# Patient Record
Sex: FEMALE | Race: WHITE | NOT HISPANIC OR LATINO | Employment: UNEMPLOYED | URBAN - NONMETROPOLITAN AREA
[De-identification: names, ages, dates, MRNs, and addresses within clinical notes are randomized per-mention and may not be internally consistent; named-entity substitution may affect disease eponyms.]

---

## 2020-12-05 ENCOUNTER — HOSPITAL ENCOUNTER (EMERGENCY)
Facility: HOSPITAL | Age: 25
Discharge: HOME | End: 2020-12-05
Attending: EMERGENCY MEDICINE
Payer: MEDICARE

## 2020-12-05 VITALS
DIASTOLIC BLOOD PRESSURE: 84 MMHG | OXYGEN SATURATION: 99 % | RESPIRATION RATE: 18 BRPM | HEIGHT: 62 IN | TEMPERATURE: 98 F | HEART RATE: 79 BPM | WEIGHT: 227.81 LBS | BODY MASS INDEX: 41.92 KG/M2 | SYSTOLIC BLOOD PRESSURE: 133 MMHG

## 2020-12-05 DIAGNOSIS — H02.845 SWELLING OF LEFT LOWER EYELID: Primary | ICD-10-CM

## 2020-12-05 PROCEDURE — 99283 EMERGENCY DEPT VISIT LOW MDM: CPT

## 2020-12-05 RX ORDER — SULFAMETHOXAZOLE AND TRIMETHOPRIM 800; 160 MG/1; MG/1
1 TABLET ORAL 2 TIMES DAILY
Qty: 14 TABLET | Refills: 0 | Status: SHIPPED | OUTPATIENT
Start: 2020-12-05 | End: 2020-12-12

## 2020-12-05 RX ORDER — DEXTROAMPHETAMINE SACCHARATE, AMPHETAMINE ASPARTATE MONOHYDRATE, DEXTROAMPHETAMINE SULFATE AND AMPHETAMINE SULFATE 2.5; 2.5; 2.5; 2.5 MG/1; MG/1; MG/1; MG/1
20 CAPSULE, EXTENDED RELEASE ORAL EVERY MORNING
Status: ON HOLD | COMMUNITY
End: 2022-12-19 | Stop reason: HOSPADM

## 2020-12-05 RX ORDER — ARIPIPRAZOLE 10 MG/1
10 TABLET, ORALLY DISINTEGRATING ORAL DAILY
Status: ON HOLD | COMMUNITY
End: 2022-12-19 | Stop reason: HOSPADM

## 2020-12-05 RX ORDER — HYDROXYZINE PAMOATE 25 MG/1
25 CAPSULE ORAL DAILY
Status: ON HOLD | COMMUNITY
End: 2022-12-19 | Stop reason: HOSPADM

## 2020-12-05 NOTE — ED NOTES
NEUROLOGICAL:   Patient is awake , alert  and oriented x 4 . Pupils are PERRL. Gait is steady.   Moves all extremities without difficulty.   Patient reports no neuro complaints..  GCS 15    HEENT:   LEFT EYE REDNESS AND SWELLING. STARTING THIS MORNING    CARDIOVASCULAR:   S1 and S2 present, no murmurs, gallops, or rubs, rate regular  and pulses palpable (2+)    On palpation no edema noted , noted to none.   Patient reports no CV complaints.  .   Patient vitals are WNL.    RESPIRATORY:   Airway Clear, Open, and Patent.  Respirations are even and unlabored.   Breath sounds clear  to all lung fields.   Patient reports no respiratory complaints.     GASTROINTESTINAL:   Abdomen is soft  and non-tender x 4 quadrants. Bowel sounds are normoactive to all quadrants .   Patient reports no GI complaints .     GENITOURINARY:   Patient reports no  complaints.     MUSCULOSKELETAL:   full range of motion to all extremities, no swelling noted , no tenderness noted and no weakness noted.   Patient reports no musculoskeletal complaints     SKIN:   Skin appears warm , dry , good turgor, color normal for race and intact. Patient reports no skin complaints .

## 2020-12-05 NOTE — ED PROVIDER NOTES
Encounter Date: 12/5/2020       History     Chief Complaint   Patient presents with    Eye Problem     woke up with left eye swollen and red about 10 am today.     This is a 25-year-old white female with no significant past medical history who presents emergency department with complaints of left eye redness and pain that began this morning upon waking up.  Patient states she woke up and noticed that her left lower eyelid was painful, swollen, and red.  Patient cannot isolate 1 solitary area of pain but rather states that the entire area swollen and painful.  She denies known injury or known precipitating events, and denies previous similar symptoms in the past.  Patient denies vision changes, fever, chills, or facial injury.        Review of patient's allergies indicates:  No Known Allergies  History reviewed. No pertinent past medical history.  History reviewed. No pertinent surgical history.  History reviewed. No pertinent family history.  Social History     Tobacco Use    Smoking status: Never Smoker    Smokeless tobacco: Never Used   Substance Use Topics    Alcohol use: Never     Frequency: Never    Drug use: Never     Review of Systems   Constitutional: Negative.    Eyes: Negative for discharge and itching.        Lower eyelid pain and redness, however eye not affected   Respiratory: Negative.    Cardiovascular: Negative.    Musculoskeletal: Negative.    Neurological: Negative.        Physical Exam     Initial Vitals [12/05/20 1315]   BP Pulse Resp Temp SpO2   133/84 79 18 98.1 °F (36.7 °C) 99 %      MAP       --         Physical Exam    Nursing note and vitals reviewed.  Constitutional: She appears well-developed and well-nourished. No distress.   HENT:   Head: Normocephalic and atraumatic.   Right Ear: External ear normal.   Left Ear: External ear normal.   Mouth/Throat: Oropharynx is clear and moist.   Eyes: Conjunctivae and EOM are normal. Pupils are equal, round, and reactive to light.   Left lower  eyelid an area near nasolacrimal duct appears mildly erythematous with mild swelling.  The patient does have mild pain upon palpation to the area, however there is no nodule or lump noted.   Neck: Normal range of motion. Neck supple.   Cardiovascular: Normal rate, regular rhythm and normal heart sounds.   Pulmonary/Chest: Breath sounds normal.   Abdominal: Soft. Bowel sounds are normal.   Musculoskeletal: Normal range of motion.   Lymphadenopathy:     She has no cervical adenopathy.   Neurological: She is alert and oriented to person, place, and time. GCS score is 15. GCS eye subscore is 4. GCS verbal subscore is 5. GCS motor subscore is 6.   Skin: Skin is warm and dry. Capillary refill takes less than 2 seconds.   Psychiatric: She has a normal mood and affect. Thought content normal.         ED Course   Procedures  Labs Reviewed - No data to display       Imaging Results    None          Medical Decision Making:   Differential Diagnosis:   Cellulitis  Contact dermatitis  Allergy symptoms  Obstruction of nasolacrimal duct                   ED Course as of Dec 05 1336   Sat Dec 05, 2020   1334 Will treat empirically for cellulitis or underlying bacterial infection    [CB]      ED Course User Index  [CB] Lani Rahman NP            Clinical Impression:     ICD-10-CM ICD-9-CM   1. Swelling of left lower eyelid  H02.845 374.82                          ED Disposition Condition    Discharge Stable        ED Prescriptions     Medication Sig Dispense Start Date End Date Auth. Provider    sulfamethoxazole-trimethoprim 800-160mg (BACTRIM DS) 800-160 mg Tab Take 1 tablet by mouth 2 (two) times daily. for 7 days 14 tablet 12/5/2020 12/12/2020 Lani Rahman NP        Follow-up Information     Follow up With Specialties Details Why Contact Info    PCP Follow UP  Call in 2 days for follow-up, for re-evaluation of today's complaint                                        Lani Rahman NP  12/05/20 1338

## 2020-12-05 NOTE — DISCHARGE INSTRUCTIONS
Take medications as directed and it is also recommended that you take Benadryl or Zyrtec for allergy symptoms.

## 2022-08-10 ENCOUNTER — HOSPITAL ENCOUNTER (EMERGENCY)
Facility: HOSPITAL | Age: 27
Discharge: HOME | End: 2022-08-10
Attending: EMERGENCY MEDICINE
Payer: MEDICARE

## 2022-08-10 VITALS
RESPIRATION RATE: 18 BRPM | OXYGEN SATURATION: 99 % | TEMPERATURE: 98 F | HEIGHT: 62 IN | WEIGHT: 268 LBS | SYSTOLIC BLOOD PRESSURE: 130 MMHG | BODY MASS INDEX: 49.32 KG/M2 | DIASTOLIC BLOOD PRESSURE: 78 MMHG | HEART RATE: 91 BPM

## 2022-08-10 DIAGNOSIS — H60.62 CHRONIC OTITIS EXTERNA OF LEFT EAR, UNSPECIFIED TYPE: Primary | ICD-10-CM

## 2022-08-10 PROCEDURE — 99283 EMERGENCY DEPT VISIT LOW MDM: CPT

## 2022-08-10 RX ORDER — NEOMYCIN SULFATE, POLYMYXIN B SULFATE AND HYDROCORTISONE 10; 3.5; 1 MG/ML; MG/ML; [USP'U]/ML
3 SUSPENSION/ DROPS AURICULAR (OTIC) 3 TIMES DAILY
Qty: 5 ML | Refills: 0 | Status: SHIPPED | OUTPATIENT
Start: 2022-08-10 | End: 2022-08-17

## 2022-08-11 NOTE — DISCHARGE INSTRUCTIONS
Use medication as directed.  Follow-up with ENT for further evaluation of your symptoms.  Return to the emergency room for worsening condition.

## 2022-08-11 NOTE — ED PROVIDER NOTES
Encounter Date: 8/10/2022       History     Chief Complaint   Patient presents with    Otalgia     Recurring ear infections to left ear. Onset 3 days ago.      This is a 27-year-old white female with noncontributory past medical history who presents to the emergency department with complaints of left ear pain.  Patient reports last month she experienced and was treated for to ear infections, stating her PCP prescribed her oral and topical antibiotics for ear infection.  The patient reports that her symptoms resolved, however she began experiencing intermittent left earache 3 days ago.  She denies URI signs and symptoms, fever, ear drainage, or any other relative symptoms at this time.        Review of patient's allergies indicates:   Allergen Reactions    Hydrocodone-acetaminophen Itching and Rash     Past Medical History:   Diagnosis Date    Narcolepsy      No past surgical history on file.  No family history on file.  Social History     Tobacco Use    Smoking status: Never Smoker    Smokeless tobacco: Never Used   Substance Use Topics    Alcohol use: Never    Drug use: Never     Review of Systems   Constitutional: Negative for fever.   HENT: Positive for ear pain. Negative for ear discharge, rhinorrhea and sore throat.    Respiratory: Negative.    Cardiovascular: Negative.        Physical Exam     Initial Vitals [08/10/22 2036]   BP Pulse Resp Temp SpO2   130/78 91 18 98.4 °F (36.9 °C) 99 %      MAP       --         Physical Exam    Nursing note and vitals reviewed.  Constitutional: She appears well-developed and well-nourished. She is active. No distress.   HENT:   Head: Normocephalic and atraumatic.   Right Ear: Tympanic membrane and ear canal normal. No mastoid tenderness.   Left Ear: Tympanic membrane normal. There is swelling. No mastoid tenderness.  No middle ear effusion.   Eyes: EOM are normal. Pupils are equal, round, and reactive to light.   Neck: Neck supple.   Normal range of  motion.  Cardiovascular: Normal rate.   Pulmonary/Chest: No respiratory distress.   Musculoskeletal:      Cervical back: Normal range of motion and neck supple.     Neurological: She is alert and oriented to person, place, and time. GCS eye subscore is 4. GCS verbal subscore is 5. GCS motor subscore is 6.   Skin: Skin is warm and dry. Capillary refill takes less than 2 seconds.   Psychiatric: She has a normal mood and affect. Her behavior is normal. Thought content normal.         ED Course   Procedures  Labs Reviewed - No data to display       Imaging Results    None          Medications - No data to display                       Clinical Impression:   Final diagnoses:  [H60.62] Chronic otitis externa of left ear, unspecified type (Primary)          ED Disposition Condition    Discharge Stable        ED Prescriptions     Medication Sig Dispense Start Date End Date Auth. Provider    neomycin-polymyxin-hydrocortisone (CORTISPORIN) 3.5-10,000-1 mg/mL-unit/mL-% otic suspension Place 3 drops into the left ear 3 (three) times daily. for 7 days 5 mL 8/10/2022 8/17/2022 Lani Rahman NP        Follow-up Information     Follow up With Specialties Details Why Contact Info    [facility]  Schedule an appointment as soon as possible for a visit in 1 week for re-evaluation of today's complaint [address] [facility] [id number]  [phone]             Lani Rahman NP  08/10/22 2050

## 2022-12-13 ENCOUNTER — HOSPITAL ENCOUNTER (INPATIENT)
Facility: HOSPITAL | Age: 27
LOS: 6 days | Discharge: HOME | DRG: 885 | End: 2022-12-19
Attending: EMERGENCY MEDICINE | Admitting: EMERGENCY MEDICINE
Payer: MEDICARE

## 2022-12-13 DIAGNOSIS — T50.902A INTENTIONAL OVERDOSE, INITIAL ENCOUNTER: Primary | ICD-10-CM

## 2022-12-13 DIAGNOSIS — T50.901A OVERDOSE: ICD-10-CM

## 2022-12-13 LAB
ALBUMIN SERPL BCP-MCNC: 3.6 G/DL (ref 3.5–5.2)
ALP SERPL-CCNC: 89 U/L (ref 55–135)
ALT SERPL W/O P-5'-P-CCNC: 41 U/L (ref 10–44)
AMPHET+METHAMPHET UR QL: NEGATIVE
ANION GAP SERPL CALC-SCNC: 8 MMOL/L (ref 8–16)
APAP SERPL-MCNC: <2 UG/ML (ref 10–20)
AST SERPL-CCNC: 24 U/L (ref 10–40)
B-HCG UR QL: NEGATIVE
BACTERIA #/AREA URNS HPF: ABNORMAL /HPF
BARBITURATES UR QL SCN>200 NG/ML: NEGATIVE
BASOPHILS # BLD AUTO: 0.06 K/UL (ref 0–0.2)
BASOPHILS NFR BLD: 0.6 % (ref 0–1.9)
BENZODIAZ UR QL SCN>200 NG/ML: NEGATIVE
BILIRUB SERPL-MCNC: 0.3 MG/DL (ref 0.1–1)
BILIRUB UR QL STRIP: NEGATIVE
BUN SERPL-MCNC: 7 MG/DL (ref 6–20)
BZE UR QL SCN: NEGATIVE
CALCIUM SERPL-MCNC: 8.6 MG/DL (ref 8.7–10.5)
CANNABINOIDS UR QL SCN: NEGATIVE
CHLORIDE SERPL-SCNC: 113 MMOL/L (ref 95–110)
CLARITY UR: CLEAR
CO2 SERPL-SCNC: 21 MMOL/L (ref 23–29)
COLOR UR: YELLOW
CREAT SERPL-MCNC: 1 MG/DL (ref 0.5–1.4)
CREAT UR-MCNC: 40.2 MG/DL (ref 15–325)
CTP QC/QA: YES
DIFFERENTIAL METHOD: ABNORMAL
EOSINOPHIL # BLD AUTO: 0.1 K/UL (ref 0–0.5)
EOSINOPHIL NFR BLD: 0.8 % (ref 0–8)
EPITH CASTS #/AREA URNS LPF: 6 /LPF
ERYTHROCYTE [DISTWIDTH] IN BLOOD BY AUTOMATED COUNT: 14.7 % (ref 11.5–14.5)
EST. GFR  (NO RACE VARIABLE): >60 ML/MIN/1.73 M^2
ESTIMATED AVG GLUCOSE: 108 MG/DL (ref 68–131)
ETHANOL SERPL-MCNC: 137 MG/DL
ETHANOL SERPL-MCNC: 172 MG/DL
GLUCOSE SERPL-MCNC: 122 MG/DL (ref 70–110)
GLUCOSE UR QL STRIP: NEGATIVE
HBA1C MFR BLD: 5.4 % (ref 4–5.6)
HCT VFR BLD AUTO: 41.3 % (ref 37–48.5)
HGB BLD-MCNC: 13.6 G/DL (ref 12–16)
HGB UR QL STRIP: NEGATIVE
IMM GRANULOCYTES # BLD AUTO: 0.03 K/UL (ref 0–0.04)
IMM GRANULOCYTES NFR BLD AUTO: 0.3 % (ref 0–0.5)
KETONES UR QL STRIP: NEGATIVE
LEUKOCYTE ESTERASE UR QL STRIP: ABNORMAL
LYMPHOCYTES # BLD AUTO: 2.6 K/UL (ref 1–4.8)
LYMPHOCYTES NFR BLD: 27.1 % (ref 18–48)
MCH RBC QN AUTO: 26.2 PG (ref 27–31)
MCHC RBC AUTO-ENTMCNC: 32.9 G/DL (ref 32–36)
MCV RBC AUTO: 80 FL (ref 82–98)
METHADONE UR QL SCN>300 NG/ML: NEGATIVE
MICROSCOPIC COMMENT: ABNORMAL
MONOCYTES # BLD AUTO: 0.8 K/UL (ref 0.3–1)
MONOCYTES NFR BLD: 8.4 % (ref 4–15)
NEUTROPHILS # BLD AUTO: 6.1 K/UL (ref 1.8–7.7)
NEUTROPHILS NFR BLD: 62.8 % (ref 38–73)
NITRITE UR QL STRIP: NEGATIVE
NRBC BLD-RTO: 0 /100 WBC
OPIATES UR QL SCN: NEGATIVE
PCP UR QL SCN>25 NG/ML: NEGATIVE
PH UR STRIP: 7 [PH] (ref 5–8)
PLATELET # BLD AUTO: 355 K/UL (ref 150–450)
PMV BLD AUTO: 9.6 FL (ref 9.2–12.9)
POTASSIUM SERPL-SCNC: 3.4 MMOL/L (ref 3.5–5.1)
PROT SERPL-MCNC: 7.9 G/DL (ref 6–8.4)
PROT UR QL STRIP: NEGATIVE
RBC # BLD AUTO: 5.19 M/UL (ref 4–5.4)
RBC #/AREA URNS HPF: 2 /HPF (ref 0–4)
SALICYLATES SERPL-MCNC: <1.7 MG/DL (ref 15–30)
SARS-COV-2 RDRP RESP QL NAA+PROBE: NEGATIVE
SODIUM SERPL-SCNC: 142 MMOL/L (ref 136–145)
SP GR UR STRIP: <=1.005 (ref 1–1.03)
TOXICOLOGY INFORMATION: NORMAL
TSH SERPL DL<=0.005 MIU/L-ACNC: 2.26 UIU/ML (ref 0.4–4)
URN SPEC COLLECT METH UR: ABNORMAL
UROBILINOGEN UR STRIP-ACNC: NEGATIVE EU/DL
WBC # BLD AUTO: 9.7 K/UL (ref 3.9–12.7)
WBC #/AREA URNS HPF: 3 /HPF (ref 0–5)
YEAST URNS QL MICRO: ABNORMAL

## 2022-12-13 PROCEDURE — 81025 URINE PREGNANCY TEST: CPT | Performed by: EMERGENCY MEDICINE

## 2022-12-13 PROCEDURE — 80061 LIPID PANEL: CPT | Performed by: STUDENT IN AN ORGANIZED HEALTH CARE EDUCATION/TRAINING PROGRAM

## 2022-12-13 PROCEDURE — 80307 DRUG TEST PRSMV CHEM ANLYZR: CPT | Performed by: EMERGENCY MEDICINE

## 2022-12-13 PROCEDURE — 85025 COMPLETE CBC W/AUTO DIFF WBC: CPT | Performed by: EMERGENCY MEDICINE

## 2022-12-13 PROCEDURE — 80053 COMPREHEN METABOLIC PANEL: CPT | Performed by: EMERGENCY MEDICINE

## 2022-12-13 PROCEDURE — 84443 ASSAY THYROID STIM HORMONE: CPT | Performed by: EMERGENCY MEDICINE

## 2022-12-13 PROCEDURE — 93010 EKG 12-LEAD: ICD-10-PCS | Mod: ,,, | Performed by: INTERNAL MEDICINE

## 2022-12-13 PROCEDURE — 93010 ELECTROCARDIOGRAM REPORT: CPT | Mod: ,,, | Performed by: INTERNAL MEDICINE

## 2022-12-13 PROCEDURE — 80143 DRUG ASSAY ACETAMINOPHEN: CPT | Performed by: EMERGENCY MEDICINE

## 2022-12-13 PROCEDURE — 99285 EMERGENCY DEPT VISIT HI MDM: CPT | Mod: 25

## 2022-12-13 PROCEDURE — 82077 ASSAY SPEC XCP UR&BREATH IA: CPT | Mod: 91 | Performed by: EMERGENCY MEDICINE

## 2022-12-13 PROCEDURE — 36415 COLL VENOUS BLD VENIPUNCTURE: CPT | Performed by: EMERGENCY MEDICINE

## 2022-12-13 PROCEDURE — 83036 HEMOGLOBIN GLYCOSYLATED A1C: CPT | Performed by: STUDENT IN AN ORGANIZED HEALTH CARE EDUCATION/TRAINING PROGRAM

## 2022-12-13 PROCEDURE — 87635 SARS-COV-2 COVID-19 AMP PRB: CPT | Performed by: EMERGENCY MEDICINE

## 2022-12-13 PROCEDURE — 81000 URINALYSIS NONAUTO W/SCOPE: CPT | Mod: 59 | Performed by: EMERGENCY MEDICINE

## 2022-12-13 PROCEDURE — 11400000 HC PSYCH PRIVATE ROOM

## 2022-12-13 PROCEDURE — 80179 DRUG ASSAY SALICYLATE: CPT | Performed by: EMERGENCY MEDICINE

## 2022-12-13 PROCEDURE — 93005 ELECTROCARDIOGRAM TRACING: CPT

## 2022-12-13 PROCEDURE — 36415 COLL VENOUS BLD VENIPUNCTURE: CPT | Performed by: STUDENT IN AN ORGANIZED HEALTH CARE EDUCATION/TRAINING PROGRAM

## 2022-12-13 RX ORDER — ACETAMINOPHEN 325 MG/1
650 TABLET ORAL EVERY 8 HOURS PRN
Status: DISCONTINUED | OUTPATIENT
Start: 2022-12-13 | End: 2022-12-13

## 2022-12-13 RX ORDER — SODIUM CHLORIDE 0.9 % (FLUSH) 0.9 %
10 SYRINGE (ML) INJECTION
Status: DISCONTINUED | OUTPATIENT
Start: 2022-12-13 | End: 2022-12-13

## 2022-12-13 RX ORDER — TALC
6 POWDER (GRAM) TOPICAL NIGHTLY PRN
Status: DISCONTINUED | OUTPATIENT
Start: 2022-12-13 | End: 2022-12-13

## 2022-12-13 RX ORDER — FAMOTIDINE 20 MG/1
20 TABLET, FILM COATED ORAL 2 TIMES DAILY
Status: DISCONTINUED | OUTPATIENT
Start: 2022-12-13 | End: 2022-12-13

## 2022-12-13 RX ORDER — ONDANSETRON 4 MG/1
8 TABLET, ORALLY DISINTEGRATING ORAL EVERY 8 HOURS PRN
Status: DISCONTINUED | OUTPATIENT
Start: 2022-12-13 | End: 2022-12-13

## 2022-12-13 RX ORDER — IBUPROFEN 200 MG
1 TABLET ORAL DAILY PRN
Status: DISCONTINUED | OUTPATIENT
Start: 2022-12-13 | End: 2022-12-13

## 2022-12-13 NOTE — ED NOTES
Spoke with Teri at poison control, IV fluids and benzos as needed for agitation. Recommended drawing routine labs, Tylenol level, and EKG. Symptomatic treatment plus psych consult. Dr. Nina aware of POC.

## 2022-12-13 NOTE — ED PROVIDER NOTES
Encounter Date: 12/13/2022       History     Chief Complaint   Patient presents with    Drug Overdose     Family stated that pt has been depressed - admitted to taking handful of vistaril and bottle of whiskey.      27-year-old female with a history of depression states roughly 2-1/2 hours ago she took a handful of Vistaril as well as drank some Ramesh Reeves.  History of for doses in the past.  She is alert oriented x3, GCS is 15.  Follows commands.    Review of patient's allergies indicates:   Allergen Reactions    Hydrocodone-acetaminophen Itching and Rash     Past Medical History:   Diagnosis Date    Narcolepsy      No past surgical history on file.  No family history on file.  Social History     Tobacco Use    Smoking status: Never    Smokeless tobacco: Never   Substance Use Topics    Alcohol use: Never    Drug use: Never     Review of Systems   Constitutional:  Negative for fever.   HENT:  Negative for sore throat.    Respiratory:  Negative for shortness of breath.    Cardiovascular:  Negative for chest pain.   Gastrointestinal:  Negative for nausea.   Genitourinary:  Negative for dysuria.   Musculoskeletal:  Negative for back pain.   Skin:  Negative for rash.   Neurological:  Negative for weakness.   Hematological:  Does not bruise/bleed easily.   Psychiatric/Behavioral:  Positive for suicidal ideas.    All other systems reviewed and are negative.    Physical Exam     Initial Vitals   BP Pulse Resp Temp SpO2   12/13/22 1530 12/13/22 1531 12/13/22 1530 12/13/22 1538 12/13/22 1531   (!) 148/101 90 20 99.3 °F (37.4 °C) 100 %      MAP       --                Physical Exam    Nursing note and vitals reviewed.  Constitutional: She appears well-developed and well-nourished. She is not diaphoretic. No distress.   HENT:   Head: Normocephalic and atraumatic.   Mouth/Throat: Oropharynx is clear and moist.   Eyes: Conjunctivae and EOM are normal. Pupils are equal, round, and reactive to light.   Neck: Neck supple. No  tracheal deviation present. No JVD present.   Normal range of motion.  Cardiovascular:  Normal rate, regular rhythm, normal heart sounds and intact distal pulses.           No murmur heard.  Pulmonary/Chest: Breath sounds normal. No stridor. No respiratory distress. She has no wheezes. She has no rhonchi. She has no rales. She exhibits no tenderness.   Abdominal: Abdomen is soft. Bowel sounds are normal.   Musculoskeletal:         General: No tenderness or edema. Normal range of motion.      Cervical back: Normal range of motion and neck supple.     Neurological: She is alert and oriented to person, place, and time. She has normal strength. No cranial nerve deficit. GCS score is 15. GCS eye subscore is 4. GCS verbal subscore is 5. GCS motor subscore is 6.   Skin: Skin is warm and dry. Capillary refill takes less than 2 seconds.   Psychiatric: Her speech is normal. She is withdrawn. She is not actively hallucinating. Cognition and memory are normal. She expresses impulsivity and inappropriate judgment. She exhibits a depressed mood. She expresses suicidal ideation. She expresses suicidal plans.       ED Course   Procedures  Labs Reviewed   CBC W/ AUTO DIFFERENTIAL - Abnormal; Notable for the following components:       Result Value    MCV 80 (*)     MCH 26.2 (*)     RDW 14.7 (*)     All other components within normal limits   COMPREHENSIVE METABOLIC PANEL - Abnormal; Notable for the following components:    Potassium 3.4 (*)     Chloride 113 (*)     CO2 21 (*)     Glucose 122 (*)     Calcium 8.6 (*)     All other components within normal limits   URINALYSIS, REFLEX TO URINE CULTURE - Abnormal; Notable for the following components:    Specific Gravity, UA <=1.005 (*)     Leukocytes, UA Trace (*)     All other components within normal limits    Narrative:     Preferred Collection Type->Urine, Clean Catch  Specimen Source->Urine   ALCOHOL,MEDICAL (ETHANOL) - Abnormal; Notable for the following components:    Alcohol,  Serum 172 (*)     All other components within normal limits   ACETAMINOPHEN LEVEL - Abnormal; Notable for the following components:    Acetaminophen (Tylenol), Serum <2.0 (*)     All other components within normal limits   SALICYLATE LEVEL - Abnormal; Notable for the following components:    Salicylate Lvl <1.7 (*)     All other components within normal limits   URINALYSIS MICROSCOPIC - Abnormal; Notable for the following components:    Epithelial Casts, UA 6 (*)     All other components within normal limits    Narrative:     Preferred Collection Type->Urine, Clean Catch  Specimen Source->Urine   TSH   DRUG SCREEN PANEL, URINE EMERGENCY    Narrative:     Preferred Collection Type->Urine, Clean Catch  Specimen Source->Urine   PREGNANCY TEST, URINE RAPID    Narrative:     Specimen Source->Urine   ALCOHOL,MEDICAL (ETHANOL)   SARS-COV-2 RDRP GENE    Narrative:     ..This test utilizes isothermal nucleic acid amplification technology to detect the SARS-CoV-2 RdRp nucleic acid segment. The analytical sensitivity (limit of detection) is 500 copies/swab.     A POSITIVE result is indicative of the presence of SARS-CoV-2 RNA; clinical correlation with patient history and other diagnostic information is necessary to determine patient infection status.    A NEGATIVE result means that SARS-CoV-2 nucleic acids are not present above the limit of detection. A NEGATIVE result should be treated as presumptive. It does not rule out the possibility of COVID-19 and should not be the sole basis for treatment decisions. If COVID-19 is strongly suspected based on clinical and exposure history, re-testing using an alternate molecular assay should be considered.     This test is only for use under the Food and Drug Administration s Emergency Use Authorization (EUA).     Commercial kits are provided by [employer]. Performance characteristics of the EUA have been independently verified by [facility] Department of Pathology  and Laboratory Medicine.   _________________________________________________________________   The authorized Fact Sheet for Healthcare Providers and the authorized Fact Sheet for Patients of the ID NOW COVID-19 are available on the FDA website:    https://www.fda.gov/media/822618/download      https://www.fda.gov/media/490458/download         EKG Readings: (Independently Interpreted)   Initial Reading: No STEMI. Rhythm: Normal Sinus Rhythm. Heart Rate: 92. Ectopy: No Ectopy. Conduction: Normal. ST Segments: Normal ST Segments. T Waves: Normal. Axis: Normal. Clinical Impression: Normal Sinus Rhythm Other Impression: QRS interval is 88 milliseconds     Imaging Results    None          Medications - No data to display  Medical Decision Making:   Differential Diagnosis:   Major depression, overdose, suicide attempt           ED Course as of 12/13/22 1729   Tue Dec 13, 2022   1652 Discussed case with  - accepted to the acute psychiatric unit once the alcohol dips below 150 [SD]      ED Course User Index  [SD] Nathen Nina MD                 Clinical Impression:   Final diagnoses:  [T50.901A] Overdose  [T50.902A] Intentional overdose, initial encounter (Primary)        ED Disposition Condition    Admit Stable                Nathen Nina MD  12/13/22 8194

## 2022-12-13 NOTE — ED NOTES
Security notified that pt is ready to be transported. Pt has been calm and cooperative, resting comfortably since changed.

## 2022-12-14 PROBLEM — R45.851 SUICIDAL IDEATION: Status: ACTIVE | Noted: 2022-12-14

## 2022-12-14 PROBLEM — F10.10 ALCOHOL ABUSE: Status: ACTIVE | Noted: 2022-12-14

## 2022-12-14 PROBLEM — E66.01 SEVERE OBESITY (BMI >= 40): Status: ACTIVE | Noted: 2022-12-14

## 2022-12-14 LAB
CHOLEST SERPL-MCNC: 203 MG/DL (ref 120–199)
CHOLEST/HDLC SERPL: 5.8 {RATIO} (ref 2–5)
HDLC SERPL-MCNC: 35 MG/DL (ref 40–75)
HDLC SERPL: 17.2 % (ref 20–50)
LDLC SERPL CALC-MCNC: 122.4 MG/DL (ref 63–159)
NONHDLC SERPL-MCNC: 168 MG/DL
TRIGL SERPL-MCNC: 228 MG/DL (ref 30–150)

## 2022-12-14 PROCEDURE — 93010 EKG 12-LEAD: ICD-10-PCS | Mod: ,,, | Performed by: INTERNAL MEDICINE

## 2022-12-14 PROCEDURE — 11400000 HC PSYCH PRIVATE ROOM

## 2022-12-14 PROCEDURE — 90833 PSYTX W PT W E/M 30 MIN: CPT | Mod: ,,, | Performed by: STUDENT IN AN ORGANIZED HEALTH CARE EDUCATION/TRAINING PROGRAM

## 2022-12-14 PROCEDURE — 36415 COLL VENOUS BLD VENIPUNCTURE: CPT | Performed by: STUDENT IN AN ORGANIZED HEALTH CARE EDUCATION/TRAINING PROGRAM

## 2022-12-14 PROCEDURE — 93010 ELECTROCARDIOGRAM REPORT: CPT | Mod: ,,, | Performed by: INTERNAL MEDICINE

## 2022-12-14 PROCEDURE — 99223 1ST HOSP IP/OBS HIGH 75: CPT | Mod: ,,, | Performed by: STUDENT IN AN ORGANIZED HEALTH CARE EDUCATION/TRAINING PROGRAM

## 2022-12-14 PROCEDURE — 93005 ELECTROCARDIOGRAM TRACING: CPT

## 2022-12-14 PROCEDURE — 99223 PR INITIAL HOSPITAL CARE,LEVL III: ICD-10-PCS | Mod: ,,, | Performed by: STUDENT IN AN ORGANIZED HEALTH CARE EDUCATION/TRAINING PROGRAM

## 2022-12-14 PROCEDURE — 90833 PR PSYCHOTHERAPY W/PATIENT W/E&M, 30 MIN (ADD ON): ICD-10-PCS | Mod: ,,, | Performed by: STUDENT IN AN ORGANIZED HEALTH CARE EDUCATION/TRAINING PROGRAM

## 2022-12-14 NOTE — SUBJECTIVE & OBJECTIVE
Past Medical History:   Diagnosis Date    Narcolepsy        No past surgical history on file.    Review of patient's allergies indicates:   Allergen Reactions    Hydrocodone-acetaminophen Itching and Rash       No current facility-administered medications on file prior to encounter.     Current Outpatient Medications on File Prior to Encounter   Medication Sig    ARIPiprazole (ABILIFY) 10 MG disintegrating tablet Take 10 mg by mouth once daily.    dextroamphetamine-amphetamine (ADDERALL XR) 10 MG 24 hr capsule Take 20 mg by mouth every morning.    hydrOXYzine pamoate (VISTARIL) 25 MG Cap Take 25 mg by mouth once daily.     Family History    None       Tobacco Use    Smoking status: Never    Smokeless tobacco: Never   Substance and Sexual Activity    Alcohol use: Never    Drug use: Never    Sexual activity: Yes     Review of Systems   Constitutional:  Negative for fatigue and fever.   HENT:  Negative for congestion, ear pain and sore throat.    Eyes:  Negative for pain and discharge.   Respiratory:  Negative for cough, shortness of breath and wheezing.    Gastrointestinal:  Negative for abdominal pain, constipation, diarrhea, nausea and vomiting.   Endocrine: Negative for cold intolerance and heat intolerance.   Genitourinary:  Negative for difficulty urinating, dysuria and frequency.   Musculoskeletal:  Negative for arthralgias.   Allergic/Immunologic: Negative for environmental allergies.   Neurological:  Negative for dizziness, tremors and seizures.   Psychiatric/Behavioral:  Positive for behavioral problems, dysphoric mood, sleep disturbance and suicidal ideas.    All other systems reviewed and are negative.  Objective:     Vital Signs (Most Recent):  Temp: 98.3 °F (36.8 °C) (12/14/22 0721)  Pulse: 82 (12/14/22 0721)  Resp: 20 (12/14/22 0721)  BP: (!) 134/91 (12/14/22 0721)  SpO2: 99 % (12/14/22 0721)   Vital Signs (24h Range):  Temp:  [98.3 °F (36.8 °C)-99.4 °F (37.4 °C)] 98.3 °F (36.8 °C)  Pulse:  [82-96]  82  Resp:  [16-20] 20  SpO2:  [96 %-100 %] 99 %  BP: (112-148)/() 134/91     Weight: 121.6 kg (268 lb)  Body mass index is 49.02 kg/m².    Physical Exam  Vitals and nursing note reviewed.   Constitutional:       Appearance: Normal appearance.   HENT:      Head: Normocephalic and atraumatic.      Nose: Nose normal.      Mouth/Throat:      Mouth: Mucous membranes are moist.      Pharynx: Oropharynx is clear.   Eyes:      Extraocular Movements: Extraocular movements intact.      Conjunctiva/sclera: Conjunctivae normal.      Pupils: Pupils are equal, round, and reactive to light.   Cardiovascular:      Rate and Rhythm: Normal rate and regular rhythm.      Pulses: Normal pulses.      Heart sounds: Normal heart sounds.   Pulmonary:      Effort: Pulmonary effort is normal.      Breath sounds: Normal breath sounds.   Abdominal:      General: Abdomen is flat. Bowel sounds are normal.      Palpations: Abdomen is soft.   Musculoskeletal:         General: Normal range of motion.      Cervical back: Normal range of motion and neck supple.   Skin:     General: Skin is warm and dry.      Capillary Refill: Capillary refill takes less than 2 seconds.      Comments: No rashes on limited skin exam.   Neurological:      General: No focal deficit present.      Mental Status: She is alert and oriented to person, place, and time.      Cranial Nerves: No cranial nerve deficit.      Comments: I Olfactory:  Sense of smell intact    II Optic:  Pupils equal round react to light.  Vision intact.    III, IV, VI, Ocular motor, Trochlear, Abducens:  Extraocular movements intact    V Trigeminal:  Facial sensation intact facial sensation intact,, muscles of mastication intact muscles of mastication intact, corneal reflex intact, corneal reflex intact    VII Facial:  Muscles of facial expression intact     VIII Vestibular cochlear: Hearing intact vestibular cochlear: Hearing intact    IX Glossopharyngeal:  Gag reflex intact.  Tasting intact.      X Vagus:  Gag reflex intact.    XI Spinal Accessory:  Shoulder shrug intact.  Head rotation intact.    XII Hypoglossal:  Tongue movements intact.     Psychiatric:      Comments: Patient appears depressed.  SI           CRANIAL NERVES     CN III, IV, VI   Pupils are equal, round, and reactive to light.     Significant Labs: All pertinent labs within the past 24 hours have been reviewed.    Significant Imaging: I have reviewed all pertinent imaging results/findings within the past 24 hours.

## 2022-12-14 NOTE — ASSESSMENT & PLAN NOTE
Body mass index is 49.02 kg/m². Morbid obesity complicates all aspects of disease management from diagnostic modalities to treatment. Weight loss encouraged and health benefits explained to patient.

## 2022-12-14 NOTE — H&P
[facility] Hospital Medicine  History & Physical    Patient Name: Myles Holt  MRN: [id number]  Patient Class: IP- Psych  Admission Date: 12/13/2022  Attending Physician: Sreedhar Zendejas III, MD   Primary Care Provider: [facility]         Patient information was obtained from ER records.     Subjective:     Principal Problem:<principal problem not specified>    Chief Complaint:   Chief Complaint   Patient presents with    Drug Overdose     Family stated that pt has been depressed - admitted to taking handful of vistaril and bottle of whiskey.         HPI:   Chief Complaint   Patient presents with    Drug Overdose       Family stated that pt has been depressed - admitted to taking handful of vistaril and bottle of whiskey.       27-year-old female with a history of depression states roughly 2-1/2 hours ago she took a handful of Vistaril as well as drank some Ramesh Reeves.  History of for doses in the past.  She is alert oriented x3, GCS is 15.  Follows commands.         Past Medical History:   Diagnosis Date    Narcolepsy        No past surgical history on file.    Review of patient's allergies indicates:   Allergen Reactions    Hydrocodone-acetaminophen Itching and Rash       No current facility-administered medications on file prior to encounter.     Current Outpatient Medications on File Prior to Encounter   Medication Sig    ARIPiprazole (ABILIFY) 10 MG disintegrating tablet Take 10 mg by mouth once daily.    dextroamphetamine-amphetamine (ADDERALL XR) 10 MG 24 hr capsule Take 20 mg by mouth every morning.    hydrOXYzine pamoate (VISTARIL) 25 MG Cap Take 25 mg by mouth once daily.     Family History    None       Tobacco Use    Smoking status: Never    Smokeless tobacco: Never   Substance and Sexual Activity    Alcohol use: Never    Drug use: Never    Sexual activity: Yes     Review of Systems   Constitutional:  Negative for fatigue and fever.    HENT:  Negative for congestion, ear pain and sore throat.    Eyes:  Negative for pain and discharge.   Respiratory:  Negative for cough, shortness of breath and wheezing.    Gastrointestinal:  Negative for abdominal pain, constipation, diarrhea, nausea and vomiting.   Endocrine: Negative for cold intolerance and heat intolerance.   Genitourinary:  Negative for difficulty urinating, dysuria and frequency.   Musculoskeletal:  Negative for arthralgias.   Allergic/Immunologic: Negative for environmental allergies.   Neurological:  Negative for dizziness, tremors and seizures.   Psychiatric/Behavioral:  Positive for behavioral problems, dysphoric mood, sleep disturbance and suicidal ideas.    All other systems reviewed and are negative.  Objective:     Vital Signs (Most Recent):  Temp: 98.3 °F (36.8 °C) (12/14/22 0721)  Pulse: 82 (12/14/22 0721)  Resp: 20 (12/14/22 0721)  BP: (!) 134/91 (12/14/22 0721)  SpO2: 99 % (12/14/22 0721)   Vital Signs (24h Range):  Temp:  [98.3 °F (36.8 °C)-99.4 °F (37.4 °C)] 98.3 °F (36.8 °C)  Pulse:  [82-96] 82  Resp:  [16-20] 20  SpO2:  [96 %-100 %] 99 %  BP: (112-148)/() 134/91     Weight: 121.6 kg (268 lb)  Body mass index is 49.02 kg/m².    Physical Exam  Vitals and nursing note reviewed.   Constitutional:       Appearance: Normal appearance.   HENT:      Head: Normocephalic and atraumatic.      Nose: Nose normal.      Mouth/Throat:      Mouth: Mucous membranes are moist.      Pharynx: Oropharynx is clear.   Eyes:      Extraocular Movements: Extraocular movements intact.      Conjunctiva/sclera: Conjunctivae normal.      Pupils: Pupils are equal, round, and reactive to light.   Cardiovascular:      Rate and Rhythm: Normal rate and regular rhythm.      Pulses: Normal pulses.      Heart sounds: Normal heart sounds.   Pulmonary:      Effort: Pulmonary effort is normal.      Breath sounds: Normal breath sounds.   Abdominal:      General: Abdomen is flat. Bowel sounds are normal.       Palpations: Abdomen is soft.   Musculoskeletal:         General: Normal range of motion.      Cervical back: Normal range of motion and neck supple.   Skin:     General: Skin is warm and dry.      Capillary Refill: Capillary refill takes less than 2 seconds.      Comments: No rashes on limited skin exam.   Neurological:      General: No focal deficit present.      Mental Status: She is alert and oriented to person, place, and time.      Cranial Nerves: No cranial nerve deficit.      Comments: I Olfactory:  Sense of smell intact    II Optic:  Pupils equal round react to light.  Vision intact.    III, IV, VI, Ocular motor, Trochlear, Abducens:  Extraocular movements intact    V Trigeminal:  Facial sensation intact facial sensation intact,, muscles of mastication intact muscles of mastication intact, corneal reflex intact, corneal reflex intact    VII Facial:  Muscles of facial expression intact     VIII Vestibular cochlear: Hearing intact vestibular cochlear: Hearing intact    IX Glossopharyngeal:  Gag reflex intact.  Tasting intact.     X Vagus:  Gag reflex intact.    XI Spinal Accessory:  Shoulder shrug intact.  Head rotation intact.    XII Hypoglossal:  Tongue movements intact.     Psychiatric:      Comments: Patient appears depressed.  SI           CRANIAL NERVES     CN III, IV, VI   Pupils are equal, round, and reactive to light.     Significant Labs: All pertinent labs within the past 24 hours have been reviewed.    Significant Imaging: I have reviewed all pertinent imaging results/findings within the past 24 hours.    Assessment/Plan:     Alcohol abuse  Cessation advised monitor for s/s of withdrawal.       Severe obesity (BMI >= 40)  Body mass index is 49.02 kg/m². Morbid obesity complicates all aspects of disease management from diagnostic modalities to treatment. Weight loss encouraged and health benefits explained to patient.         Suicidal ideation  To be admitted to our inpatient psychiatric unit for  further evaluation and management.          VTE Risk Mitigation (From admission, onward)    None             Marcus Marquez Jr, MD  Department of Hospital Medicine   [facility]

## 2022-12-14 NOTE — PLAN OF CARE
"Myles Romanoemiliano admitted to [facility] under the care of Sreedhar Zendejas MD with diagnosis of suicidal ideation with depression. [facility] Patient is a 26yo female who presented to [facility] ER.    Patient withdrawn, depressed, calm, cooperative, quiet, anxious, sleeping, drowsy, and showing pressured speech. Endorses Suicidal Ideation. Denies Homicidal Ideation, Auditory Hallucinations, Visual Hallucinations, Tactile Hallucinations, Gustatory Hallucinations, and Delusions. Patient alcohol blood level was 172 upon arrival to the ER and her UDS was negative for any other substance. Patient's ex-boyfriend moved out of state with another girl and the patient took a handful of vistaril and drank a bottle of Ramesh Reeves. Patient had attempted suicide once before a long time ago by taking pills as well. Patient stated "I fucked up". Patient very tearful upon assessment.      Patient assessed and oriented to room unit and routine. Patient denies pain or discomfort at present and remains injury-free.     LUTHER GOODMAN RN    "

## 2022-12-14 NOTE — NURSING
Pt. A/A.  No C/O pain.  Just admitted at shift change.  Denies SI/HI/AVH currently.  Does have so anxiety and depression.  Flat affect.  Tearful.  Follows instructions.  Will continue to observe.
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
## 2022-12-14 NOTE — PLAN OF CARE
POC reviewed this shift and is on going. Patient is withdrawn, depressed, cooperative, and anxious. Endorses Suicidal Ideation. Denies Homicidal Ideation, Auditory Hallucinations, Visual Hallucinations, Tactile Hallucinations, Gustatory Hallucinations, and Delusions. Minimal interaction with peers, isolating to self. Pt continues to be medication compliant and staff will continue to monitor pt closely while on the unit.

## 2022-12-14 NOTE — HPI
Chief Complaint   Patient presents with    Drug Overdose       Family stated that pt has been depressed - admitted to taking handful of vistaril and bottle of whiskey.       27-year-old female with a history of depression states roughly 2-1/2 hours ago she took a handful of Vistaril as well as drank some Ramesh Reeves.  History of for doses in the past.  She is alert oriented x3, GCS is 15.  Follows commands.

## 2022-12-14 NOTE — PLAN OF CARE
Problem: Bariatric Environmental Safety  Goal: Safety Maintained with Care  Outcome: Ongoing, Progressing     Problem: Violence Risk or Actual  Goal: Anger and Impulse Control  Outcome: Ongoing, Progressing     Problem: Adult Behavioral Health Plan of Care  Goal: Plan of Care Review  Outcome: Ongoing, Progressing  Goal: Patient-Specific Goal (Individualization)  Outcome: Ongoing, Progressing  Goal: Adheres to Safety Considerations for Self and Others  Outcome: Ongoing, Progressing  Goal: Absence of New-Onset Illness or Injury  Outcome: Ongoing, Progressing  Goal: Optimized Coping Skills in Response to Life Stressors  Outcome: Ongoing, Progressing  Goal: Develops/Participates in Therapeutic [place] to Support Successful Transition  Outcome: Ongoing, Progressing  Goal: Rounds/Family Conference  Outcome: Ongoing, Progressing     Problem: Activity and Energy Impairment (Excessive Substance Use)  Goal: Optimized Energy Level (Excessive Substance Use)  Outcome: Ongoing, Progressing     Problem: Behavior Regulation Impairment (Excessive Substance Use)  Goal: Improved Behavioral Control (Excessive Substance Use)  Outcome: Ongoing, Progressing     Problem: Suicidal Behavior  Goal: Suicidal Behavior is Absent or Managed  Outcome: Ongoing, Progressing     Problem: Feelings of Worthlessness, Hopelessness or Excessive Guilt (Depressive Signs/Symptoms)  Goal: Enhanced Self-Esteem and Confidence (Depressive Signs/Symptoms)  Outcome: Ongoing, Progressing     Problem: Nutrition Imbalance (Depressive Signs/Symptoms)  Goal: Optimized Nutrition Intake  Outcome: Ongoing, Progressing     Problem: Sleep Disturbance (Depressive Signs/Symptoms)  Goal: Improved Sleep (Depressive Signs/Symptoms)  Outcome: Ongoing, Progressing

## 2022-12-15 PROCEDURE — 11400000 HC PSYCH PRIVATE ROOM

## 2022-12-15 PROCEDURE — 99233 SBSQ HOSP IP/OBS HIGH 50: CPT | Mod: ,,, | Performed by: STUDENT IN AN ORGANIZED HEALTH CARE EDUCATION/TRAINING PROGRAM

## 2022-12-15 PROCEDURE — 25000003 PHARM REV CODE 250: Performed by: STUDENT IN AN ORGANIZED HEALTH CARE EDUCATION/TRAINING PROGRAM

## 2022-12-15 PROCEDURE — 99233 PR SUBSEQUENT HOSPITAL CARE,LEVL III: ICD-10-PCS | Mod: ,,, | Performed by: STUDENT IN AN ORGANIZED HEALTH CARE EDUCATION/TRAINING PROGRAM

## 2022-12-15 RX ORDER — FLUOXETINE HYDROCHLORIDE 20 MG/1
20 CAPSULE ORAL DAILY
Status: DISCONTINUED | OUTPATIENT
Start: 2022-12-15 | End: 2022-12-16

## 2022-12-15 RX ADMIN — FLUOXETINE 20 MG: 20 CAPSULE ORAL at 02:12

## 2022-12-15 NOTE — PLAN OF CARE
POC reviewed this shift and is ongoing.  Pt cooperative with staff and interacting with peers. Denies SI. No ss of distress. Will continue to monitor for changes and safety.

## 2022-12-15 NOTE — PLAN OF CARE
POC reviewed this shift and is on going. Patient is depressed, cooperative, quiet, and paranoid.does not  Endorses Suicidal Ideation, Homicidal Ideation, Auditory Hallucinations, and Visual Hallucinations. Denies the attempted vistaril overdose,which have caused this admission,have been depressed prior,condition was exacerbated due to non medication compliance,stated I was on prozac for a while. Pt continues to be medication compliant and staff will continue to monitor pt closely while on the unit. Care as per treatment plan.

## 2022-12-15 NOTE — PLAN OF CARE
Pt was encouraged to attend group but refused. Pt was offered 1:1 but refused. Continue to encourage pt to participate in process groups to verbalize feelings and develop healthy coping skills.

## 2022-12-15 NOTE — PROGRESS NOTES
"PSYCHIATRY DAILY INPATIENT PROGRESS NOTE  SUBSEQUENT HOSPITAL VISIT    ENCOUNTER DATE: 12/15/2022  SITE: [facility]    DATE OF ADMISSION: 12/13/2022  3:27 PM  LENGTH OF STAY: 2 days      CHIEF COMPLAINT   Myles Holt is a 27 y.o. female, seen during daily goldstein rounds on the inpatient unit.  Myles Holt presented with the chief complaint of  suicide attempt      The patient was seen and examined. The chart was reviewed.     Reviewed notes from Rns and labs from the last 24 hours.    The patient's case was discussed with the treatment team/care providers today including Rns and     Staff reports no behavioral or management issues.     The patient has been compliant with treatment.      Subjective 12/15/2022       Today the patient reports "yesterday was the first day I drank in a while, and everything, the stress of stuff and depression was piling up and I got overwhelmed."      The patient denies any side effects to medications.        Interim/overnight events per report/notes:          Psychiatric ROS (observed, reported, or endorsed/denied):  Depressed mood - less  Interest/pleasure/anhedonia: less  Guilt/hopelessness/worthlessness - less  Changes in Sleep - No  Changes in Appetite - No  Changes in Concentration - No  Changes in Energy - less  PMA/R- less  Suicidal- active/passive ideations - less  Homicidal ideations: active/passive ideations - No    Hallucinations - No  Delusions - No  Disorganized behavior - No  Disorganized speech - No  Negative symptoms - No    Elevated mood - No  Decreased need for sleep - No  Grandiosity - No  Racing thoughts - No  Impulsivity - No  Irritability- No  Increased energy - No  Distractibility - No  Increase in goal-directed activity or PMA- No    Symptoms of ANTONIO - No  Symptoms of Panic Disorder- less  Symptoms of PTSD - less        Overall progress: Patient is showing mild improvement             Medical ROS  Review of Systems   Constitutional:  Negative " "for chills and fever.   HENT:  Negative for hearing loss.    Eyes:  Negative for blurred vision and double vision.   Respiratory:  Negative for shortness of breath.    Cardiovascular:  Negative for chest pain and palpitations.   Gastrointestinal:  Negative for constipation, diarrhea, nausea and vomiting.   Genitourinary:  Negative for dysuria.   Musculoskeletal:  Negative for back pain and neck pain.   Skin:  Negative for rash.   Neurological:  Negative for dizziness and headaches.   Endo/Heme/Allergies:  Negative for environmental allergies.       PAST MEDICAL HISTORY   Past Medical History:   Diagnosis Date    Narcolepsy            PSYCHOTROPIC MEDICATIONS   Scheduled Meds:  Continuous Infusions:  PRN Meds:.        EXAMINATION    VITALS   Vitals:    12/13/22 1824 12/14/22 0721 12/14/22 1943 12/15/22 0724   BP: 124/79 (!) 134/91 133/73 (!) 135/93   BP Location: Left arm Left arm  Left arm   Patient Position: Sitting Sitting  Sitting   Pulse: 82 82 82 68   Resp: 18 20 16 20   Temp: 99.4 °F (37.4 °C) 98.3 °F (36.8 °C) 98.7 °F (37.1 °C) 98.3 °F (36.8 °C)   TempSrc: Oral Oral  Oral   SpO2: 96% 99% 99% 99%   Weight: 121.6 kg (268 lb)      Height: 5' 2" (1.575 m)          Body mass index is 49.02 kg/m².        CONSTITUTIONAL  General Appearance: unremarkable, age appropriate    MUSCULOSKELETAL  Muscle Strength and Tone:no tremor, no tic  Abnormal Involuntary Movements: No  Gait and Station: non-ataxic    PSYCHIATRIC   Level of Consciousness: awake and alert   Orientation: person, place, and situation  Grooming: Casually dressed and Well groomed  Psychomotor Behavior: normal, cooperative  Speech: normal tone, normal rate, normal pitch, normal volume  Language: grossly intact  Mood: "ok"  Affect: Consistent with mood  Thought Process: linear, logical  Associations: intact   Thought Content: less SI, denies HI, and no delusions  Perceptions: denies AH and denies  VH  Memory: Able to recall past events, Remote intact, and " Recent intact  Attention:Attends to interview without distraction  Fund of Knowledge: Aware of current events and Vocabulary appropriate   Estimate if Intelligence:  below average based on work/education history, vocabulary and mental status exam  Insight: has awareness of illness  Judgment: behavior is adequate to circumstances        DIAGNOSTIC TESTING   Laboratory Results  No results found for this or any previous visit (from the past 24 hour(s)).         MEDICAL DECISION MAKING          ASSESSMENT      MDD  Suicide attempt via Overdose of medications  PTSD  Panic attacks  Alcohol abuse  Nicotine dependence  Obesity  Psychosocial stressors           PROBLEM LIST AND MANAGEMENT PLANS         MDD  - resume home medication prozac 20 mg PO qd today  -increase to 40 mg PO qd tomorrow, reviewed EKG  - pt counseled  - follow up with outpatient mental health providers after discharge for medication management and psychotherapy       PTSD  - resume home medication prozac 20 mg PO qd today  -increase to 40 mg PO qd tomorrow, reviewed EKG  - stop abilify   - pt counseled  - follow up with outpatient mental health providers after discharge for medication management and psychotherapy        Suicide attempt via Overdose of medications  - FM consulted  - continue psychiatric hospitalization  - provide psychotherapeutic interventions and medication management  - monitor         Panic attacks  - hold hydroxyzine for now 2/2 overdose  - pt counseled  - follow up with outpatient mental health providers after discharge for medication management and psychotherapy           Alcohol abuse  - SW consulted for assistance with additional resources   Alcohol Brief Intervention     Discussed with patient that there is concern he/she is drinking at unhealthy levels known to increase his/her risk of alcohol-related health problems - yes  Discussed general feedback on health risks associated with drinking and/or given personalized feedback -  yes  Patient was advised to abstain from alcohol use - Yes     - follow up with outpatient mental health providers after discharge for medication management and psychotherapy          Nicotine dependence  - continue nicotine patch 14 mg PO qd PRN     Obesity  - change abilify to geodon as above  - pt counseled        Psychosocial stressors  - pt counseled  - SW consulted for assistance with additional resources               Discussed diagnosis, risks and benefits of proposed treatment vs alternative treatments vs no treatment, potential side effects of these treatments and the inherent unpredictability of treatment. The patient expresses understanding of the above and displays the capacity to agree with this treatment given said understanding. Patient also agrees that, currently, the benefits outweigh the risks and would like to pursue/continue treatment at this time.    Any medications being used off-label were discussed with the patient inclusive of the evidence base for the use of the medications and consent was obtained for the off-label use of the medication.       DISCHARGE PLANNING  Expected Disposition Plan: Home or Self Care      NEED FOR CONTINUED HOSPITALIZATION  Psychiatric illness continues to pose a potential threat to life or bodily function, of self or others, thereby requiring the need for continued inpatient psychiatric hospitalization: Yes, due to: danger to self, as evidenced by: overdose     Protective inpatient pyschiatric hospitalization required while a safe disposition plan is enacted: Yes    Patient stabilized and ready for discharge from inpatient psychiatric unit: No        STAFF:   Sreedhar Zendejas III, MD  Psychiatry
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
## 2022-12-15 NOTE — PLAN OF CARE
POC reviewed this shift and is on going.  Pt cooperative with staff. No interacting with peers and is withdrawn to her room. No meds admin. Pt went to bed after she ate. No ss of distress. Will continue to monitor for changes and safety.

## 2022-12-16 PROCEDURE — 25000003 PHARM REV CODE 250: Performed by: PSYCHIATRY & NEUROLOGY

## 2022-12-16 PROCEDURE — 99232 PR SUBSEQUENT HOSPITAL CARE,LEVL II: ICD-10-PCS | Mod: ,,, | Performed by: PSYCHIATRY & NEUROLOGY

## 2022-12-16 PROCEDURE — 99232 SBSQ HOSP IP/OBS MODERATE 35: CPT | Mod: ,,, | Performed by: PSYCHIATRY & NEUROLOGY

## 2022-12-16 PROCEDURE — 90833 PSYTX W PT W E/M 30 MIN: CPT | Mod: ,,, | Performed by: PSYCHIATRY & NEUROLOGY

## 2022-12-16 PROCEDURE — 90833 PR PSYCHOTHERAPY W/PATIENT W/E&M, 30 MIN (ADD ON): ICD-10-PCS | Mod: ,,, | Performed by: PSYCHIATRY & NEUROLOGY

## 2022-12-16 PROCEDURE — 25000003 PHARM REV CODE 250: Performed by: STUDENT IN AN ORGANIZED HEALTH CARE EDUCATION/TRAINING PROGRAM

## 2022-12-16 PROCEDURE — 11400000 HC PSYCH PRIVATE ROOM

## 2022-12-16 RX ORDER — FLUOXETINE HYDROCHLORIDE 20 MG/1
40 CAPSULE ORAL DAILY
Status: DISCONTINUED | OUTPATIENT
Start: 2022-12-17 | End: 2022-12-19 | Stop reason: HOSPADM

## 2022-12-16 RX ORDER — ACETAMINOPHEN 325 MG/1
650 TABLET ORAL EVERY 6 HOURS PRN
Status: DISCONTINUED | OUTPATIENT
Start: 2022-12-16 | End: 2022-12-19 | Stop reason: HOSPADM

## 2022-12-16 RX ORDER — FLUOXETINE HYDROCHLORIDE 20 MG/1
20 CAPSULE ORAL ONCE
Status: COMPLETED | OUTPATIENT
Start: 2022-12-16 | End: 2022-12-16

## 2022-12-16 RX ADMIN — FLUOXETINE 20 MG: 20 CAPSULE ORAL at 11:12

## 2022-12-16 RX ADMIN — ACETAMINOPHEN 650 MG: 325 TABLET ORAL at 11:12

## 2022-12-16 RX ADMIN — FLUOXETINE 20 MG: 20 CAPSULE ORAL at 08:12

## 2022-12-16 NOTE — PROGRESS NOTES
"PSYCHIATRY DAILY INPATIENT PROGRESS NOTE  SUBSEQUENT HOSPITAL VISIT    ENCOUNTER DATE: 12/16/2022  SITE: [facility]    DATE OF ADMISSION: 12/13/2022  3:27 PM  LENGTH OF STAY: 3 days    CHIEF COMPLAINT   Myles Holt is a 27 y.o. female, seen during daily goldstein rounds on the inpatient unit.  Myles Holt presented with the chief complaint of  suicide attempt        The patient was seen and examined. The chart was reviewed.      Reviewed notes from Rns and labs from the last 24 hours.     The patient's case was discussed with the treatment team/care providers today including Rns and      Staff reports no behavioral or management issues.      The patient has been compliant with treatment.    Subjective 12/16/2022     Patient reports mood is "good." Currently denies suicidal or homicidal ideations. Patient does admit to overdose attempt, however states that she "decided it wasn't a good idea" and called EMS. Reports that "things have just been really stressful and piling up lately."    Pt reports that she does have an outpatient psychiatrist at [facility]. Also reports history of seeing a private-practice therapist in the [facility] that she plans to re-establish care with post-discharge.     Reports sleeping well last night. Denies SE to current medication regimen.     Psychiatric ROS (observed, reported, or endorsed/denied):  Depressed mood - less  Interest/pleasure/anhedonia: less  Guilt/hopelessness/worthlessness - less  Changes in Sleep - No  Changes in Appetite - No  Changes in Concentration - No  Changes in Energy - less  PMA/R- less  Suicidal- active/passive ideations - less  Homicidal ideations: active/passive ideations - No     Hallucinations - No  Delusions - No  Disorganized behavior - No  Disorganized speech - No  Negative symptoms - No     Elevated mood - No  Decreased need for sleep - No  Grandiosity - No  Racing thoughts - No  Impulsivity - No  Irritability- " No  Increased energy - No  Distractibility - No  Increase in goal-directed activity or PMA- No     Symptoms of ANTONIO - No  Symptoms of Panic Disorder- less  Symptoms of PTSD - less           Overall progress: Patient is showing mild improvement     Psychotherapy:  Target symptoms: depression  Why chosen therapy is appropriate versus another modality: relevant to diagnosis, evidence based practice  Outcome monitoring methods: self-report, observation  Therapeutic intervention type: insight oriented psychotherapy, supportive psychotherapy  Topics discussed/themes: building skills sets for symptom management, symptom recognition  The patient's response to the intervention is accepting. The patient's progress toward treatment goals is good.   Duration of intervention: 16 minutes.    Medical ROS  Review of Systems   Constitutional:  Negative for chills and fever.   HENT:  Negative for hearing loss.    Eyes:  Negative for blurred vision and double vision.   Respiratory:  Negative for shortness of breath.    Cardiovascular:  Negative for chest pain and palpitations.   Gastrointestinal:  Negative for constipation, diarrhea, nausea and vomiting.   Genitourinary:  Negative for dysuria.   Musculoskeletal:  Negative for back pain and neck pain.   Skin:  Negative for rash.   Neurological:  Negative for dizziness and headaches.   Endo/Heme/Allergies:  Negative for environmental allergies.   Psychiatric: As noted    PAST MEDICAL HISTORY   Past Medical History:   Diagnosis Date    Narcolepsy        PSYCHOTROPIC MEDICATIONS   Scheduled Meds:   FLUoxetine  20 mg Oral Daily     Continuous Infusions:  PRN Meds:.    EXAMINATION    VITALS   Vitals:    12/14/22 1943 12/15/22 0724 12/15/22 1921 12/16/22 0748   BP: 133/73 (!) 135/93 116/68 129/87   BP Location:  Left arm Left arm Left arm   Patient Position:  Sitting Sitting Sitting   Pulse: 82 68 76 69   Resp: 16 20 16 18   Temp: 98.7 °F (37.1 °C) 98.3 °F (36.8 °C) 98.8 °F (37.1 °C) 98.9 °F  "(37.2 °C)   TempSrc:  Oral Oral Oral   SpO2: 99% 99% 97% 99%   Weight:       Height:           Body mass index is 49.02 kg/m².       CONSTITUTIONAL  General Appearance: unremarkable, age appropriate     MUSCULOSKELETAL  Muscle Strength and Tone:no tremor, no tic  Abnormal Involuntary Movements: No  Gait and Station: non-ataxic     PSYCHIATRIC   Level of Consciousness: awake and alert   Orientation: person, place, and situation  Grooming: Casually dressed and Well groomed  Psychomotor Behavior: normal, cooperative  Speech: normal tone, normal rate, normal pitch, normal volume  Language: grossly intact  Mood: "Good"  Affect: Consistent with mood  Thought Process: linear, logical  Associations: intact   Thought Content: less SI, denies HI, and no delusions  Perceptions: denies AH and denies  VH  Memory: Able to recall past events, Remote intact, and Recent intact  Attention:Attends to interview without distraction  Fund of Knowledge: Aware of current events and Vocabulary appropriate   Estimate if Intelligence:  below average based on work/education history, vocabulary and mental status exam  Insight: has awareness of illness  Judgment: behavior is adequate to circumstances    DIAGNOSTIC TESTING   Laboratory Results  No results found for this or any previous visit (from the past 24 hour(s)).    ASSESSMENT      MDD  Suicide attempt via Overdose of medications  PTSD  Panic attacks  Alcohol abuse  Nicotine dependence  Obesity  Psychosocial stressors           PROBLEM LIST AND MANAGEMENT PLANS           MDD  - resume home medication prozac 20 mg PO qd today  -increase to 40 mg PO qd tomorrow,   -Increase to 40 mg today-reviewed EKG  - pt counseled  - follow up with outpatient mental health providers after discharge for medication management and psychotherapy        PTSD  - resume home medication prozac 20 mg PO qd today  -increase to 40 mg PO qd tomorrow, reviewed EKG  - stop abilify   - pt counseled  - follow up with " outpatient mental health providers after discharge for medication management and psychotherapy        Suicide attempt via Overdose of medications  - FM consulted  - continue psychiatric hospitalization  - provide psychotherapeutic interventions and medication management  - monitor         Panic attacks  - hold hydroxyzine for now 2/2 overdose  - pt counseled  - follow up with outpatient mental health providers after discharge for medication management and psychotherapy           Alcohol abuse  - SW consulted for assistance with additional resources   Alcohol Brief Intervention     Discussed with patient that there is concern he/she is drinking at unhealthy levels known to increase his/her risk of alcohol-related health problems - yes  Discussed general feedback on health risks associated with drinking and/or given personalized feedback - yes  Patient was advised to abstain from alcohol use - Yes     - follow up with outpatient mental health providers after discharge for medication management and psychotherapy           Nicotine dependence  - continue nicotine patch 14 mg PO qd PRN     Obesity  - change abilify to geodon as above  - pt counseled        Psychosocial stressors  - pt counseled  - SW consulted for assistance with additional resources                  Discussed diagnosis, risks and benefits of proposed treatment vs alternative treatments vs no treatment, potential side effects of these treatments and the inherent unpredictability of treatment. The patient expresses understanding of the above and displays the capacity to agree with this treatment given said understanding. Patient also agrees that, currently, the benefits outweigh the risks and would like to pursue/continue treatment at this time.     Any medications being used off-label were discussed with the patient inclusive of the evidence base for the use of the medications and consent was obtained for the off-label use of the medication.          DISCHARGE PLANNING  Expected Disposition Plan: Home or Self Care        NEED FOR CONTINUED HOSPITALIZATION  Psychiatric illness continues to pose a potential threat to life or bodily function, of self or others, thereby requiring the need for continued inpatient psychiatric hospitalization: Yes, due to: danger to self, as evidenced by: overdose      Protective inpatient pyschiatric hospitalization required while a safe disposition plan is enacted: Yes     Patient stabilized and ready for discharge from inpatient psychiatric unit: No      STAFF:   Isauro Arce NP  Psychiatry

## 2022-12-16 NOTE — PLAN OF CARE
Collateral:   Kirit Radford, father, [phone]    Collateral Perception of Problem: no not really, I just know she got very upset     Previous Psych History/Hospitalizations:  None     Suicide Attempts (how/severity):  This is the first one ermington heard of     How long has pt had problems (childhood dx?):  n/a     Impulse issues:  Nothing out of the ordinary     History of violence:   Not sure     Drug Use:  None     Alcohol Use:  None     Legal Issues:  None     Other Pertinent Info:   Just got reunited, been a little over a year   Was real hurt about breakup with emely she was dating     Baseline:  Very good personality, kind soft hearted jokes a lot sometimes gets quiet isolates self at times     Discharge Plan:  Return home

## 2022-12-16 NOTE — PLAN OF CARE
POC reviewed and ongoing. Pt states that she is doing better. Remains with some depression, but no SI since on unit. Denies any hallucinations. Rested well. Will continue to monitor for safety and any changes.

## 2022-12-16 NOTE — PLAN OF CARE
"Patient is sitting in the dining room eating supper meal at this time. Patient endorses mood is good, depression improving, sleeping well, denies S/I, denies A/VH. No delusions present. Patient attended group session with  today and was actively participating expressing feelings. Patient noted smiling and having several telephone conversations with family and tolerating well. Isauro Arce NP visited this am with new orders noted increase Fluoxetine 40 mg po daily, Give additional Fluoxetine 20 mg x 1. Appetite is good and is medication compliant. PRN Acetaminophen 650 mg po given for c/o headache 10/10 and was noted effective. Patient stated " I am allergic to the Norco not the Tylenol." Close observations continued and safe environment maintained.  "

## 2022-12-17 PROCEDURE — 99232 PR SUBSEQUENT HOSPITAL CARE,LEVL II: ICD-10-PCS | Mod: ,,, | Performed by: STUDENT IN AN ORGANIZED HEALTH CARE EDUCATION/TRAINING PROGRAM

## 2022-12-17 PROCEDURE — 25000003 PHARM REV CODE 250: Performed by: PSYCHIATRY & NEUROLOGY

## 2022-12-17 PROCEDURE — 99232 SBSQ HOSP IP/OBS MODERATE 35: CPT | Mod: ,,, | Performed by: STUDENT IN AN ORGANIZED HEALTH CARE EDUCATION/TRAINING PROGRAM

## 2022-12-17 PROCEDURE — 11400000 HC PSYCH PRIVATE ROOM

## 2022-12-17 RX ADMIN — FLUOXETINE 40 MG: 20 CAPSULE ORAL at 08:12

## 2022-12-17 NOTE — PROGRESS NOTES
"PSYCHIATRY DAILY INPATIENT PROGRESS NOTE  SUBSEQUENT HOSPITAL VISIT    ENCOUNTER DATE: 12/17/2022  SITE: [facility]    DATE OF ADMISSION: 12/13/2022  3:27 PM  LENGTH OF STAY: 4 days    CHIEF COMPLAINT   Myles Holt is a 27 y.o. female, seen during daily goldstein rounds on the inpatient unit.  Myles Holt presented with the chief complaint of  suicide attempt        The patient was seen and examined. The chart was reviewed.      Reviewed notes from NP, RN, SW and labs from the last 24 hours.     The patient's case was discussed with the treatment team/care providers today including Rns     Staff reports no behavioral or management issues.      The patient has been compliant with treatment.      Subjective 12/17/2022    She states she is feeling better. She states she feels medications are effective and denies any ASE.    Reports "I shouldn't have done it," in regards to overdose. She states her coping skill is to talk to her family.    Protective factors are her family.         Psychiatric ROS (observed, reported, or endorsed/denied):  Depressed mood - less  Interest/pleasure/anhedonia: less  Guilt/hopelessness/worthlessness - less  Changes in Sleep - No  Changes in Appetite - No  Changes in Concentration - No  Changes in Energy - less  PMA/R- less  Suicidal- active/passive ideations - less  Homicidal ideations: active/passive ideations - No     Hallucinations - No  Delusions - No  Disorganized behavior - No  Disorganized speech - No  Negative symptoms - No     Elevated mood - No  Decreased need for sleep - No  Grandiosity - No  Racing thoughts - No  Impulsivity - No  Irritability- No  Increased energy - No  Distractibility - No  Increase in goal-directed activity or PMA- No     Symptoms of ANTONIO - No  Symptoms of Panic Disorder- less  Symptoms of PTSD - less           Overall progress: Patient is showing mild improvement     Medical ROS  Review of Systems   Constitutional:  Negative for chills and " "fever.   HENT:  Negative for hearing loss.    Eyes:  Negative for blurred vision and double vision.   Respiratory:  Negative for shortness of breath.    Cardiovascular:  Negative for chest pain and palpitations.   Gastrointestinal:  Negative for constipation, diarrhea, nausea and vomiting.   Genitourinary:  Negative for dysuria.   Musculoskeletal:  Negative for back pain and neck pain.   Skin:  Negative for rash.   Neurological:  Negative for dizziness and headaches.   Endo/Heme/Allergies:  Negative for environmental allergies.   Psychiatric: As noted    PAST MEDICAL HISTORY   Past Medical History:   Diagnosis Date    Narcolepsy        PSYCHOTROPIC MEDICATIONS   Scheduled Meds:   FLUoxetine  40 mg Oral Daily     Continuous Infusions:  PRN Meds:.    EXAMINATION    VITALS   Vitals:    12/15/22 0724 12/15/22 1921 12/16/22 0748 12/17/22 0759   BP: (!) 135/93 116/68 129/87 126/61   BP Location: Left arm Left arm Left arm Right arm   Patient Position: Sitting Sitting Sitting Sitting   Pulse: 68 76 69 78   Resp: 20 16 18 20   Temp: 98.3 °F (36.8 °C) 98.8 °F (37.1 °C) 98.9 °F (37.2 °C) 98 °F (36.7 °C)   TempSrc: Oral Oral Oral Oral   SpO2: 99% 97% 99% 99%   Weight:       Height:           Body mass index is 49.02 kg/m².         CONSTITUTIONAL  General Appearance: unremarkable, age appropriate     MUSCULOSKELETAL  Muscle Strength and Tone:no tremor, no tic  Abnormal Involuntary Movements: No  Gait and Station: non-ataxic     PSYCHIATRIC   Level of Consciousness: awake and alert   Orientation: person, place, and situation  Grooming: Casually dressed and Well groomed  Psychomotor Behavior: normal, cooperative  Speech: normal tone, normal rate, normal pitch, normal volume  Language: grossly intact  Mood: "Good"  Affect: Consistent with mood  Thought Process: linear, logical  Associations: intact   Thought Content: less SI, denies HI, and no delusions  Perceptions: denies AH and denies  VH  Memory: Able to recall past events, " Remote intact, and Recent intact  Attention:Attends to interview without distraction  Fund of Knowledge: Aware of current events and Vocabulary appropriate   Estimate if Intelligence:  below average based on work/education history, vocabulary and mental status exam  Insight: has awareness of illness  Judgment: behavior is adequate to circumstances        DIAGNOSTIC TESTING   Laboratory Results  No results found for this or any previous visit (from the past 24 hour(s)).    ASSESSMENT      MDD  Suicide attempt via Overdose of medications  PTSD  Panic attacks  Alcohol abuse  Nicotine dependence  Obesity  Psychosocial stressors           PROBLEM LIST AND MANAGEMENT PLANS           MDD  - resume home medication prozac 20 mg PO qd today  -increase to 40 mg PO qd tomorrow,   -Increase to 40 mg today-reviewed EKG  - pt counseled  - follow up with outpatient mental health providers after discharge for medication management and psychotherapy        PTSD  - resume home medication prozac 20 mg PO qd today  -increase to 40 mg PO qd tomorrow, reviewed EKG  - stop abilify   - pt counseled  - follow up with outpatient mental health providers after discharge for medication management and psychotherapy        Suicide attempt via Overdose of medications  - FM consulted  - continue psychiatric hospitalization  - provide psychotherapeutic interventions and medication management  - monitor         Panic attacks  - hold hydroxyzine for now 2/2 overdose  - pt counseled  - follow up with outpatient mental health providers after discharge for medication management and psychotherapy           Alcohol abuse  - SW consulted for assistance with additional resources   Alcohol Brief Intervention     Discussed with patient that there is concern he/she is drinking at unhealthy levels known to increase his/her risk of alcohol-related health problems - yes  Discussed general feedback on health risks associated with drinking and/or given personalized  feedback - yes  Patient was advised to abstain from alcohol use - Yes     - follow up with outpatient mental health providers after discharge for medication management and psychotherapy           Nicotine dependence  - continue nicotine patch 14 mg PO qd PRN     Obesity  - change abilify to geodon as above  - pt counseled        Psychosocial stressors  - pt counseled  - SW consulted for assistance with additional resources                  Discussed diagnosis, risks and benefits of proposed treatment vs alternative treatments vs no treatment, potential side effects of these treatments and the inherent unpredictability of treatment. The patient expresses understanding of the above and displays the capacity to agree with this treatment given said understanding. Patient also agrees that, currently, the benefits outweigh the risks and would like to pursue/continue treatment at this time.     Any medications being used off-label were discussed with the patient inclusive of the evidence base for the use of the medications and consent was obtained for the off-label use of the medication.         DISCHARGE PLANNING  Expected Disposition Plan: Home or Self Care        NEED FOR CONTINUED HOSPITALIZATION  Psychiatric illness continues to pose a potential threat to life or bodily function, of self or others, thereby requiring the need for continued inpatient psychiatric hospitalization: Yes, due to: danger to self, as evidenced by: overdose      Protective inpatient pyschiatric hospitalization required while a safe disposition plan is enacted: Yes     Patient stabilized and ready for discharge from inpatient psychiatric unit: No    The patient location is: [facility]    Visit type: audiovisual    Face to Face time with patient: 5  10 minutes of total time spent on the encounter, which includes face to face time and non-face to face time preparing to see the patient (eg, review of tests), Obtaining and/or reviewing separately  obtained history, Documenting clinical information in the electronic or other health record, Independently interpreting results (not separately reported) and communicating results to the patient/family/caregiver, or Care coordination (not separately reported).     Each patient to whom he or she provides medical services by telemedicine is:  (1) informed of the relationship between the physician and patient and the respective role of any other health care provider with respect to management of the patient; and (2) notified that he or she may decline to receive medical services by telemedicine and may withdraw from such care at any time.        STAFF:   Sreedhar Zendejas III, MD  Psychiatry

## 2022-12-17 NOTE — PLAN OF CARE
Patient is lying in bed napping at this time. Patient is alert and oriented, calm, cooperative, endorses feeling better and depression is improving. Patient is tolerating medication recent increase dosage without side effects noted. Denies S/I, denies A/VH. No delusions present. No negative behaviors noted. Patient voiced has had time to think about what brought her to the hospital. Patient is having telephone conversations with family and tolerating well, and focused on discharge. Close observations continued and safe environment maintained.

## 2022-12-17 NOTE — PLAN OF CARE
Problem: Bariatric Environmental Safety  Goal: Safety Maintained with Care  Outcome: Ongoing, Progressing     Problem: Violence Risk or Actual  Goal: Anger and Impulse Control  Outcome: Ongoing, Progressing     Problem: Adult Behavioral Health Plan of Care  Goal: Plan of Care Review  Outcome: Ongoing, Progressing  Goal: Patient-Specific Goal (Individualization)  Outcome: Ongoing, Progressing  Goal: Adheres to Safety Considerations for Self and Others  Outcome: Ongoing, Progressing  Goal: Absence of New-Onset Illness or Injury  Outcome: Ongoing, Progressing  Goal: Optimized Coping Skills in Response to Life Stressors  Outcome: Ongoing, Progressing  Goal: Develops/Participates in Therapeutic [place] to Support Successful Transition  Outcome: Ongoing, Progressing  Goal: Rounds/Family Conference  Outcome: Ongoing, Progressing     Problem: Activity and Energy Impairment (Excessive Substance Use)  Goal: Optimized Energy Level (Excessive Substance Use)  Outcome: Ongoing, Progressing     Problem: Behavior Regulation Impairment (Excessive Substance Use)  Goal: Improved Behavioral Control (Excessive Substance Use)  Outcome: Ongoing, Progressing     Problem: Suicidal Behavior  Goal: Suicidal Behavior is Absent or Managed  Outcome: Ongoing, Progressing     Problem: Feelings of Worthlessness, Hopelessness or Excessive Guilt (Depressive Signs/Symptoms)  Goal: Enhanced Self-Esteem and Confidence (Depressive Signs/Symptoms)  Outcome: Ongoing, Progressing     Problem: Nutrition Imbalance (Depressive Signs/Symptoms)  Goal: Optimized Nutrition Intake  Outcome: Ongoing, Progressing     Problem: Sleep Disturbance (Depressive Signs/Symptoms)  Goal: Improved Sleep (Depressive Signs/Symptoms)  Outcome: Ongoing, Progressing

## 2022-12-17 NOTE — NURSING
Pt. Lying in her bed.  Easily aroused.  No C/O pain.  Denies SI/HI/AVH currently.  Does have some anxiety/depression.  Flat affect.  Compliant and follows instructions.  Will continue to observe.

## 2022-12-18 PROCEDURE — 99232 SBSQ HOSP IP/OBS MODERATE 35: CPT | Mod: ,,, | Performed by: STUDENT IN AN ORGANIZED HEALTH CARE EDUCATION/TRAINING PROGRAM

## 2022-12-18 PROCEDURE — 25000003 PHARM REV CODE 250: Performed by: PSYCHIATRY & NEUROLOGY

## 2022-12-18 PROCEDURE — 11400000 HC PSYCH PRIVATE ROOM

## 2022-12-18 PROCEDURE — 99232 PR SUBSEQUENT HOSPITAL CARE,LEVL II: ICD-10-PCS | Mod: ,,, | Performed by: STUDENT IN AN ORGANIZED HEALTH CARE EDUCATION/TRAINING PROGRAM

## 2022-12-18 RX ADMIN — FLUOXETINE 40 MG: 20 CAPSULE ORAL at 08:12

## 2022-12-18 NOTE — PLAN OF CARE
Problem: Bariatric Environmental Safety  Goal: Safety Maintained with Care  Outcome: Ongoing, Progressing     Problem: Violence Risk or Actual  Goal: Anger and Impulse Control  Outcome: Ongoing, Progressing     Problem: Adult Behavioral Health Plan of Care  Goal: Plan of Care Review  Outcome: Ongoing, Progressing  Goal: Patient-Specific Goal (Individualization)  Outcome: Ongoing, Progressing  Goal: Adheres to Safety Considerations for Self and Others  Outcome: Ongoing, Progressing  Goal: Absence of New-Onset Illness or Injury  Outcome: Ongoing, Progressing  Goal: Optimized Coping Skills in Response to Life Stressors  Outcome: Ongoing, Progressing  Goal: Develops/Participates in Therapeutic [place] to Support Successful Transition  Outcome: Ongoing, Progressing  Goal: Rounds/Family Conference  Outcome: Ongoing, Progressing     Problem: Activity and Energy Impairment (Excessive Substance Use)  Goal: Optimized Energy Level (Excessive Substance Use)  Outcome: Ongoing, Progressing     Problem: Behavior Regulation Impairment (Excessive Substance Use)  Goal: Improved Behavioral Control (Excessive Substance Use)  Outcome: Ongoing, Progressing     Problem: Suicidal Behavior  Goal: Suicidal Behavior is Absent or Managed  Outcome: Ongoing, Progressing     Problem: Feelings of Worthlessness, Hopelessness or Excessive Guilt (Depressive Signs/Symptoms)  Goal: Enhanced Self-Esteem and Confidence (Depressive Signs/Symptoms)  Outcome: Ongoing, Progressing     Problem: Nutrition Imbalance (Depressive Signs/Symptoms)  Goal: Optimized Nutrition Intake  Outcome: Ongoing, Progressing     Problem: Sleep Disturbance (Depressive Signs/Symptoms)  Goal: Improved Sleep (Depressive Signs/Symptoms)  Outcome: Ongoing, Progressing

## 2022-12-18 NOTE — PLAN OF CARE
Patient is sitting in the dining room eating supper at this time. Patient is alert and oriented, has been out of her room most of the day, engaging with staff and peers ad lyn[place], cooperative, and is focused on discharge. Patient watched tv and enjoyed the [place] football game. Patient endorses that her depression improved, denies S/I, and is looking forward to spending [place] with her sister. Dr. Zendejas visited per telemed with no new orders noted. Patient is candidate for discharge tomorrow. Close observations continued and safe environment maintained.

## 2022-12-18 NOTE — PROGRESS NOTES
"PSYCHIATRY DAILY INPATIENT PROGRESS NOTE  SUBSEQUENT HOSPITAL VISIT    ENCOUNTER DATE: 12/18/2022  SITE: [facility]    DATE OF ADMISSION: 12/13/2022  3:27 PM  LENGTH OF STAY: 5 days    CHIEF COMPLAINT   Myles Holt is a 27 y.o. female, seen during daily goldstein rounds on the inpatient unit.  Myles Holt presented with the chief complaint of  suicide attempt        The patient was seen and examined. The chart was reviewed.      Reviewed notes from RN, LPN and labs from the last 24 hours.     The patient's case was discussed with the treatment team/care providers today including Rns     Staff reports no behavioral or management issues.      The patient has been compliant with treatment.      Subjective 12/18/2022    She states "I feel better, my mood is better." Planning for discharge tomorrow. She states "I'm not drinking like that again."    Pt denies ASE to medications.      Psychiatric ROS (observed, reported, or endorsed/denied):  Depressed mood - less  Interest/pleasure/anhedonia: less  Guilt/hopelessness/worthlessness - less  Changes in Sleep - No  Changes in Appetite - No  Changes in Concentration - No  Changes in Energy - less  PMA/R- less  Suicidal- active/passive ideations - less  Homicidal ideations: active/passive ideations - No     Hallucinations - No  Delusions - No  Disorganized behavior - No  Disorganized speech - No  Negative symptoms - No     Elevated mood - No  Decreased need for sleep - No  Grandiosity - No  Racing thoughts - No  Impulsivity - No  Irritability- No  Increased energy - No  Distractibility - No  Increase in goal-directed activity or PMA- No     Symptoms of ANTONIO - No  Symptoms of Panic Disorder- less  Symptoms of PTSD - less           Overall progress: Patient is showing mild improvement         Medical ROS  Review of Systems   Constitutional:  Negative for chills and fever.   HENT:  Negative for hearing loss.    Eyes:  Negative for blurred vision and double vision. " "  Respiratory:  Negative for shortness of breath.    Cardiovascular:  Negative for chest pain and palpitations.   Gastrointestinal:  Negative for constipation, diarrhea, nausea and vomiting.   Genitourinary:  Negative for dysuria.   Musculoskeletal:  Negative for back pain and neck pain.   Skin:  Negative for rash.   Neurological:  Negative for dizziness and headaches.   Endo/Heme/Allergies:  Negative for environmental allergies.   Psychiatric: As noted    PAST MEDICAL HISTORY   Past Medical History:   Diagnosis Date    Narcolepsy        PSYCHOTROPIC MEDICATIONS   Scheduled Meds:   FLUoxetine  40 mg Oral Daily     Continuous Infusions:  PRN Meds:.    EXAMINATION    VITALS   Vitals:    12/16/22 0748 12/17/22 0759 12/17/22 1933 12/18/22 0800   BP: 129/87 126/61 123/75 103/63   BP Location: Left arm Right arm Left arm Left arm   Patient Position: Sitting Sitting Sitting Sitting   Pulse: 69 78 65 63   Resp: 18 20 16 20   Temp: 98.9 °F (37.2 °C) 98 °F (36.7 °C) 98.4 °F (36.9 °C) 98.1 °F (36.7 °C)   TempSrc: Oral Oral Oral Oral   SpO2: 99% 99% 98% 97%   Weight:       Height:           Body mass index is 49.02 kg/m².         CONSTITUTIONAL  General Appearance: unremarkable, age appropriate     MUSCULOSKELETAL  Muscle Strength and Tone:no tremor, no tic  Abnormal Involuntary Movements: No  Gait and Station: non-ataxic     PSYCHIATRIC   Level of Consciousness: awake and alert   Orientation: person, place, and situation  Grooming: Casually dressed and Well groomed  Psychomotor Behavior: normal, cooperative  Speech: normal tone, normal rate, normal pitch, normal volume  Language: grossly intact  Mood: "better"  Affect: Consistent with mood  Thought Process: linear, logical  Associations: intact   Thought Content: less SI, denies HI, and no delusions  Perceptions: denies AH and denies  VH  Memory: Able to recall past events, Remote intact, and Recent intact  Attention:Attends to interview without distraction  Fund of Knowledge: " Aware of current events and Vocabulary appropriate   Estimate if Intelligence:  below average based on work/education history, vocabulary and mental status exam  Insight: has awareness of illness  Judgment: behavior is adequate to circumstances        DIAGNOSTIC TESTING   Laboratory Results  No results found for this or any previous visit (from the past 24 hour(s)).    ASSESSMENT      MDD  Suicide attempt via Overdose of medications  PTSD  Panic attacks  Alcohol abuse  Nicotine dependence  Obesity  Psychosocial stressors           PROBLEM LIST AND MANAGEMENT PLANS           MDD  - resume home medication prozac 20 mg PO qd today  -increase to 40 mg PO qd tomorrow,   -Increase to 40 mg today-reviewed EKG  - pt counseled  - follow up with outpatient mental health providers after discharge for medication management and psychotherapy        PTSD  - resume home medication prozac 20 mg PO qd -increase to 40 mg PO qd tody, reviewed EKG  - stop abilify   - pt counseled  - follow up with outpatient mental health providers after discharge for medication management and psychotherapy        Suicide attempt via Overdose of medications  - FM consulted  - continue psychiatric hospitalization  - provide psychotherapeutic interventions and medication management  - monitor         Panic attacks  - hold hydroxyzine for now 2/2 overdose  - pt counseled  - follow up with outpatient mental health providers after discharge for medication management and psychotherapy           Alcohol abuse  - SW consulted for assistance with additional resources   Alcohol Brief Intervention     Discussed with patient that there is concern he/she is drinking at unhealthy levels known to increase his/her risk of alcohol-related health problems - yes  Discussed general feedback on health risks associated with drinking and/or given personalized feedback - yes  Patient was advised to abstain from alcohol use - Yes     - follow up with outpatient mental health  providers after discharge for medication management and psychotherapy           Nicotine dependence  - continue nicotine patch 14 mg PO qd PRN     Obesity  - change abilify to geodon as above  - pt counseled        Psychosocial stressors  - pt counseled  - SW consulted for assistance with additional resources                  Discussed diagnosis, risks and benefits of proposed treatment vs alternative treatments vs no treatment, potential side effects of these treatments and the inherent unpredictability of treatment. The patient expresses understanding of the above and displays the capacity to agree with this treatment given said understanding. Patient also agrees that, currently, the benefits outweigh the risks and would like to pursue/continue treatment at this time.     Any medications being used off-label were discussed with the patient inclusive of the evidence base for the use of the medications and consent was obtained for the off-label use of the medication.         DISCHARGE PLANNING  Expected Disposition Plan: Home or Self Care, anticipate stability for discharge tomorrow        NEED FOR CONTINUED HOSPITALIZATION  Psychiatric illness continues to pose a potential threat to life or bodily function, of self or others, thereby requiring the need for continued inpatient psychiatric hospitalization: Yes, due to: danger to self, as evidenced by: overdose      Protective inpatient pyschiatric hospitalization required while a safe disposition plan is enacted: Yes     Patient stabilized and ready for discharge from inpatient psychiatric unit: No    The patient location is: [facility] ([place]    Visit type: audiovisual    Face to Face time with patient: 5  10 minutes of total time spent on the encounter, which includes face to face time and non-face to face time preparing to see the patient (eg, review of tests), Obtaining and/or reviewing separately obtained history, Documenting clinical information in the electronic or  other health record, Independently interpreting results (not separately reported) and communicating results to the patient/family/caregiver, or Care coordination (not separately reported).     Each patient to whom he or she provides medical services by telemedicine is:  (1) informed of the relationship between the physician and patient and the respective role of any other health care provider with respect to management of the patient; and (2) notified that he or she may decline to receive medical services by telemedicine and may withdraw from such care at any time.        STAFF:   Sreedhar Zendejas III, MD  Psychiatry

## 2022-12-18 NOTE — NURSING
Pt. A/A.  No C/O pain.  Denies SI/HI/AVH currently.  Does have some depression and anxiety.  Flat affect.  Medication compliant and follows instructions.  Will continue to monitor.

## 2022-12-19 VITALS
SYSTOLIC BLOOD PRESSURE: 130 MMHG | RESPIRATION RATE: 20 BRPM | TEMPERATURE: 99 F | WEIGHT: 268 LBS | DIASTOLIC BLOOD PRESSURE: 69 MMHG | BODY MASS INDEX: 49.32 KG/M2 | HEIGHT: 62 IN | HEART RATE: 74 BPM | OXYGEN SATURATION: 98 %

## 2022-12-19 PROBLEM — F33.2 MDD (MAJOR DEPRESSIVE DISORDER), RECURRENT SEVERE, WITHOUT PSYCHOSIS: Status: ACTIVE | Noted: 2022-12-19

## 2022-12-19 PROBLEM — R45.851 SUICIDAL IDEATION: Status: RESOLVED | Noted: 2022-12-14 | Resolved: 2022-12-19

## 2022-12-19 PROCEDURE — 99239 PR HOSPITAL DISCHARGE DAY,>30 MIN: ICD-10-PCS | Mod: ,,, | Performed by: PSYCHIATRY & NEUROLOGY

## 2022-12-19 PROCEDURE — 99239 HOSP IP/OBS DSCHRG MGMT >30: CPT | Mod: ,,, | Performed by: PSYCHIATRY & NEUROLOGY

## 2022-12-19 PROCEDURE — 90833 PR PSYCHOTHERAPY W/PATIENT W/E&M, 30 MIN (ADD ON): ICD-10-PCS | Mod: ,,, | Performed by: PSYCHIATRY & NEUROLOGY

## 2022-12-19 PROCEDURE — 90833 PSYTX W PT W E/M 30 MIN: CPT | Mod: ,,, | Performed by: PSYCHIATRY & NEUROLOGY

## 2022-12-19 PROCEDURE — 25000003 PHARM REV CODE 250: Performed by: PSYCHIATRY & NEUROLOGY

## 2022-12-19 RX ORDER — FLUOXETINE HYDROCHLORIDE 40 MG/1
40 CAPSULE ORAL DAILY
Qty: 30 CAPSULE | Refills: 2 | Status: SHIPPED | OUTPATIENT
Start: 2022-12-20 | End: 2023-12-20

## 2022-12-19 RX ADMIN — FLUOXETINE 40 MG: 20 CAPSULE ORAL at 08:12

## 2022-12-19 NOTE — PLAN OF CARE
12/19/22 1030   Medicare Message   Important Message from Medicare regarding Discharge Appeal Rights Given to patient/caregiver;Other (comments);Explained to patient/caregiver;Signed/date by patient/caregiver   Date IMM was signed 12/19/22   Time IMM was signed [id number]

## 2022-12-19 NOTE — PLAN OF CARE
Problem: Adult Behavioral Health Plan of Care  Goal: Optimized Coping Skills in Response to Life Stressors  Intervention: Promote Effective Coping Strategies  Flowsheets (Taken 12/16/2022 1500)  Supportive Measures:   active listening utilized   self-reflection promoted   verbalization of feelings encouraged       Behavior: pt was engaged and cooperative     Intervention: In this CBT-focused process group CSW facilitated group discussion on moving through fear.  Each patient was asked to identify and discuss fears that are standing in the way of their treatment goals.  Patients were then asked to discuss ways in which they could work to overcome those fears.      Response: pt shared that she doesn't have any fears. She tends to set little goals occasionally and accomplish them one at a time. Pt shared she accomplished a fear within this past year which was being on her own.     Plan: Continue to encourage pt to participate in process groups to verbalize feelings and develop healthy coping skills.

## 2022-12-19 NOTE — PROGRESS NOTES
Psychotherapy:  Target symptoms: depression  Why chosen therapy is appropriate versus another modality: relevant to diagnosis  Outcome monitoring methods: self-report  Therapeutic intervention type: supportive psychotherapy  Topics discussed/themes: building skills sets for symptom management, symptom recognition  Safety plan and wrap up session  The patient's response to the intervention is accepting. The patient's progress toward treatment goals is fair.   Duration of intervention: 16 minutes.  Adonay Stark MD  Psychiatry

## 2022-12-19 NOTE — NURSING
Pt. Lying in bed.  Easily awoken.  No C/O pain.  Denies SI/HI/AVH currently.  Does have some anxiety and depression.  Medications compliant and follows instructions.  Will continue to observe.

## 2022-12-19 NOTE — PLAN OF CARE
Problem: Bariatric Environmental Safety  Goal: Safety Maintained with Care  Outcome: Ongoing, Progressing     Problem: Violence Risk or Actual  Goal: Anger and Impulse Control  Outcome: Ongoing, Progressing     Problem: Adult Behavioral Health Plan of Care  Goal: Plan of Care Review  Outcome: Ongoing, Progressing  Goal: Patient-Specific Goal (Individualization)  Outcome: Ongoing, Progressing  Goal: Adheres to Safety Considerations for Self and Others  Outcome: Ongoing, Progressing  Goal: Absence of New-Onset Illness or Injury  Outcome: Ongoing, Progressing  Goal: Optimized Coping Skills in Response to Life Stressors  Outcome: Ongoing, Progressing  Goal: Develops/Participates in Therapeutic [place] to Support Successful Transition  Outcome: Ongoing, Progressing  Goal: Rounds/Family Conference  Outcome: Ongoing, Progressing     Problem: Activity and Energy Impairment (Excessive Substance Use)  Goal: Optimized Energy Level (Excessive Substance Use)  Outcome: Ongoing, Progressing     Problem: Behavior Regulation Impairment (Excessive Substance Use)  Goal: Improved Behavioral Control (Excessive Substance Use)  Outcome: Ongoing, Progressing     Problem: Suicidal Behavior  Goal: Suicidal Behavior is Absent or Managed  Outcome: Ongoing, Progressing     Problem: Feelings of Worthlessness, Hopelessness or Excessive Guilt (Depressive Signs/Symptoms)  Goal: Enhanced Self-Esteem and Confidence (Depressive Signs/Symptoms)  Outcome: Ongoing, Progressing     Problem: Nutrition Imbalance (Depressive Signs/Symptoms)  Goal: Optimized Nutrition Intake  Outcome: Ongoing, Progressing     Problem: Sleep Disturbance (Depressive Signs/Symptoms)  Goal: Improved Sleep (Depressive Signs/Symptoms)  Outcome: Ongoing, Progressing

## 2022-12-19 NOTE — NURSING
Patient has met the necessary criteria to be discharged today. Patient was explained all discharge instructions and the patient verbalized an understanding of those instructions. Patient was given all her belongings back. Patient was escorted off the floor and out of the hospital by staff.

## 2022-12-19 NOTE — DISCHARGE SUMMARY
"Discharge Summary  Psychiatry    Admit Date: 12/13/2022    Discharge Date and Time:  12/19/2022 8:55 AM    Attending Physician: Sreedhar Zendejas III, MD     Discharge Provider: Adonay Stark    Reason for Admission:    thoughts of death/suicide    History of Present Illness:   The patient presented to the ER on 12/13/2022 .     The patient was medically cleared and admitted to the [facility].           Per RN:  Spoke with Teri at poison control, IV fluids and benzos as needed for agitation. Recommended drawing routine labs, Tylenol level, and EKG. Symptomatic treatment plus psych consult. Dr. Nina aware of POC        Per ED MD:       Drug Overdose         Family stated that pt has been depressed - admitted to taking handful of vistaril and bottle of whiskey.       27-year-old female with a history of depression states roughly 2-1/2 hours ago she took a handful of Vistaril as well as drank some Jack Reeves.  History of for doses in the past.  She is alert oriented x3, GCS is 15.  Follows commands.        Per RN:  Patient withdrawn, depressed, calm, cooperative, quiet, anxious, sleeping, drowsy, and showing pressured speech. Endorses Suicidal Ideation. Denies Homicidal Ideation, Auditory Hallucinations, Visual Hallucinations, Tactile Hallucinations, Gustatory Hallucinations, and Delusions. Patient alcohol blood level was 172 upon arrival to the ER and her UDS was negative for any other substance. Patient's ex-boyfriend moved out of state with another girl and the patient took a handful of vistaril and drank a bottle of Jack Reeves. Patient had attempted suicide once before a long time ago by taking pills as well. Patient stated "I fucked up". Patient very tearful upon assessment.          Psychiatric interview:  "I messed up, I was drinking Jack Reeves and I took pills, I had a lot on my mind, because I found out I may not be able to have kids, my family said it was for the best and it just got to me, I'm tired of " "people telling what I can and can't do my whole life." She states she was having suicidal thoughts and after overdose, "I called for help." She states she has SI "off and on," since childhood, "it gets bad when I'm stressed." She states she is ready to have children but her family states she is not ready. Reports depressed mood for the last few days.              Symptoms of Depression: diminished mood - Yes, loss of interest/anhedonia - Yes;  recurrent - Yes, >14 days - No, diminished energy - Yes, change in sleep - No, change in appetite - No, diminished concentration or cognition or indecisiveness - No, PMA/R -  Yes, excessive guilt or hopelessness or worthlessness - Yes, suicidal ideations - Yes     Changes in Sleep: trouble with initiation- No, maintenance, - No early morning awakening with inability to return to sleep - No, hypersomnolence - No     Suicidal- active/passive ideations - Yes, organized plans, future intentions - Yes     Homicidal ideations: active/passive ideations - No, organized plans, future intentions - No     Symptoms of psychosis: hallucinations -  reports AH in childhood, none since 8 yo, "a little girl I'd hear" , delusions - No, disorganized speech - No, disorganized behavior or abnormal motor behavior - No, or negative symptoms (diminshed emotional expression, avolition, anhedonia, alogia, asociality) - No, active phase symptoms >1 month - No, continuous signs of illness > 6 months - No, since onset of illness decreased level of functioning present - No     Symptoms of robyn or hypomania: elevated, expansive, or irritable mood with increased energy or activity - No; > 4 days - No,  >7 days - No; with inflated self-esteem or grandiosity - No, decreased need for sleep - No, increased rate of speech - No, FOI or racing thoughts - No, distractibility - No, increased goal directed activity or PMA - No, risky/disinhibited behavior - No     Symptoms of ANTONIO: excessive anxiety/worry/fear, more " "days than not, about numerous issues - No, ongoing for >6 months - No, difficult to control - No, with restlessness - No, fatigue - No, poor concentration - No, irritability - No, muscle tension - No, sleep disturbance - No; causes functionally impairing distress - No     Symptoms of Panic Disorder: recurrent panic attacks (palpitations/heart racing, sweating, shakiness, dyspnea, choking, chest pain/discomfort, Gi symptoms, dizzy/lightheadedness, hot/col flashes, paresthesias, derealization, fear of losing control or fear of dying or fear of "going crazy") - Yes, precipitated - Yes, un-precipitated - No, source of worry and/or behavioral changes secondary for 1 month or longer- No, agoraphobia - No     Symptoms of PTSD: h/o trauma exposure - Yes; re-experiencing/intrusive symptoms - Yes, avoidant behavior - Yes, 2 or more negative alterations in cognition or mood - Yes, 2 or more hyperarousal symptoms - Yes; with dissociative symptoms - Yes, ongoing for 1 or more  months - Yes     Symptoms of OCD: obsessions (recurrent thoughts/urges/images; intrusive and/or unwanted; uses other thoughts/actions to suppress) - No; compulsions (repetitive behaviors used to lower distress/anxiety/obsessions) - No, time-consuming (over 1 hour per day) or cause significant distress/impairment - - No     Symptoms of Anorexia: restriction of caloric intake leading to significantly low body weight - No, intense fear of gaining weight or persistent behavior that interferes with weight gain even thought at a significantly low weight - No, disturbance in the way in which one's body weight or shape is experienced, undue influence of body weight or shape on self evaluation, or persistent lack of recognition of the seriousness of the current low body weight - No     Symptoms of Bulimia: recurrent episodes of binge eating (definitely larger amount  than what others would eat and lack of a sense of control over eating during episode) - No, " recurrent inappropriate compensatory behaviors in order to prevent weight gain (fasting, medications, exercise, vomiting) - No, binges and compensatory behaviors both occur on average at least once a week for 3 months - No, self evaluations is unduly influenced by body shape/weight- - No    Procedures Performed: * No surgery found *    Hospital Course:    Patient was admitted to the inpatient psychiatry unit after being medically cleared in the ED. Chart and labs were reviewed. The patient was stabilized as follows:      MDD  - resume home medication prozac 20 mg PO qd today  -increase to 40 mg PO qd tomorrow,   -Increase to 40 mg today-reviewed EKG  - pt counseled  - follow up with outpatient mental health providers after discharge for medication management and psychotherapy        PTSD  - resume home medication prozac 20 mg PO qd -increase to 40 mg PO qd tody, reviewed EKG  - stop abilify   - pt counseled  - follow up with outpatient mental health providers after discharge for medication management and psychotherapy        Suicide attempt via Overdose of medications  - FM consulted  - continue psychiatric hospitalization  - provide psychotherapeutic interventions and medication management  - monitor         Panic attacks  - hold hydroxyzine for now 2/2 overdose  - pt counseled  - follow up with outpatient mental health providers after discharge for medication management and psychotherapy           Alcohol abuse  - SW consulted for assistance with additional resources   Alcohol Brief Intervention     Discussed with patient that there is concern he/she is drinking at unhealthy levels known to increase his/her risk of alcohol-related health problems - yes  Discussed general feedback on health risks associated with drinking and/or given personalized feedback - yes  Patient was advised to abstain from alcohol use - Yes     - follow up with outpatient mental health providers after discharge for medication management and  psychotherapy           Nicotine dependence  - continue nicotine patch 14 mg PO qd PRN     Obesity  - change abilify to geodon as above  - pt counseled        Psychosocial stressors  - pt counseled  - SW consulted for assistance with additional resources            During hospitalization, the patient was encouraged to go to both groups and individual counseling. Patient was monitored for any side effects. A meeting was held with multidisciplinary team prior to discharge and pt's diagnosis, current medications, and follow up were discussed. The patient has been compliant with treatment and can adequately attend to activities of daily living in an independent manner. The patient denies any side effects. The patient denies SI, HI, plan or intent for self harm or harm to others. The patient is no longer a danger to self or others nor gravely disabled disabled. Patient discharged  in stable condition with scheduled outpatient follow up.    The patient reports improved symptoms. The patient is requesting discharge. The patient is currently stable for discharge home and is able/willing to attend outpatient care. The patient can identify positive social support and coping skills. The patient is hopeful, future oriented and goal directed.    The patient denied any current symptoms of withdrawals. The patient declined rehab.    Psychiatric ROS (observed, reported, or endorsed/denied):  Depressed mood -improved  Interest/pleasure/anhedonia: improved  Guilt/hopelessness/worthlessness - improved  Changes in Sleep - No  Changes in Appetite - No  Changes in Concentration - No  Changes in Energy - improved  PMA/R- no  Suicidal- active/passive ideations - improved  Homicidal ideations: active/passive ideations - No     Hallucinations - No  Delusions - No  Disorganized behavior - No  Disorganized speech - No  Negative symptoms - No     Elevated mood - No  Decreased need for sleep - No  Grandiosity - No  Racing thoughts -  No  Impulsivity - No  Irritability- No  Increased energy - No  Distractibility - No  Increase in goal-directed activity or PMA- No     Symptoms of ANTONIO - No  Symptoms of Panic Disorder- improved  Symptoms of PTSD - improved         Discussed diagnosis, risks and benefits of proposed treatment vs alternative treatments vs no treatment, and potential side effects of these treatments.  The patient expresses understanding of the above and displays the capacity to agree with this treatment given said understanding.  Patient also agrees that, currently, the benefits outweigh the risks and would like to pursue treatment at this time.      Discharge MSE: stated age, casually dressed, well groomed.  No psychomotor agitation or retardation.  No abnormal involuntary movements.  Gait normal.  Speech normal, conversational.  Language fluent English. Mood fine.  Affect normal range, pleasant, euthymic.  Thought process linear.  Associations intact.  Denies suicidal or homicidal ideation.  Denies auditory hallucinations, paranoid ideation, ideas of reference.  Memory intact.  Attention intact.  Fund of knowledge intact.  Insight intact.  Judgment intact.  Alert and oriented to person, place, time.      Tobacco Usage:  Is patient a smoker? Yes  Does patient want prescription for Tobacco Cessation? No  Does patient want counseling for Tobacco Cessation? No    If patient would like to quit, then over the counter nicotine patch could be used. The patient could also follow up with his PCP or psychiatric provider for other alternatives.     Tobacco Use Treatment Practical Counseling    Were the following discussed with the patient:    Recognizing danger situations:    A. Alcohol use during the first month after quitting - Yes   B. Being around smoke and/or smokers or time/situations when the patient routinely smoked - Yes   C. Triggers and/or roadblocks are the same as danger situations - Yes    2.   Developing coping skills   A.  Learning new ways to manage stress - Yes   B. Exercising and/or relaxation breathing - Yes   C. Changing routines - Yes   D. Distraction techniques to prevent tobacco use - Yes    3.   Basic information about quitting:   A. Benefits of quitting tobacco - Yes   B. How to quit techniques - Yes   C. Available resources to support quitting - Yes        Final Diagnoses:    Principal Problem: MDD, recurrent, severe without psychotic features   Secondary Diagnoses:   Suicide attempt via Overdose of medications  PTSD  Panic attacks  Alcohol abuse  Nicotine dependence  Obesity  Psychosocial stressors    Labs:  Admission on 12/13/2022   Component Date Value Ref Range Status    WBC 12/13/2022 9.70  3.90 - 12.70 K/uL Final    RBC 12/13/2022 5.19  4.00 - 5.40 M/uL Final    Hemoglobin 12/13/2022 13.6  12.0 - 16.0 g/dL Final    Hematocrit 12/13/2022 41.3  37.0 - 48.5 % Final    MCV 12/13/2022 80 (L)  82 - 98 fL Final    MCH 12/13/2022 26.2 (L)  27.0 - 31.0 pg Final    MCHC 12/13/2022 32.9  32.0 - 36.0 g/dL Final    RDW 12/13/2022 14.7 (H)  11.5 - 14.5 % Final    Platelets 12/13/2022 355  150 - 450 K/uL Final    MPV 12/13/2022 9.6  9.2 - 12.9 fL Final    Immature Granulocytes 12/13/2022 0.3  0.0 - 0.5 % Final    Gran # (ANC) 12/13/2022 6.1  1.8 - 7.7 K/uL Final    Immature Grans (Abs) 12/13/2022 0.03  0.00 - 0.04 K/uL Final    Lymph # 12/13/2022 2.6  1.0 - 4.8 K/uL Final    Mono # 12/13/2022 0.8  0.3 - 1.0 K/uL Final    Eos # 12/13/2022 0.1  0.0 - 0.5 K/uL Final    Baso # 12/13/2022 0.06  0.00 - 0.20 K/uL Final    nRBC 12/13/2022 0  0 /100 WBC Final    Gran % 12/13/2022 62.8  38.0 - 73.0 % Final    Lymph % 12/13/2022 27.1  18.0 - 48.0 % Final    Mono % 12/13/2022 8.4  4.0 - 15.0 % Final    Eosinophil % 12/13/2022 0.8  0.0 - 8.0 % Final    Basophil % 12/13/2022 0.6  0.0 - 1.9 % Final    Differential Method 12/13/2022 Automated   Final    Sodium 12/13/2022 142  136 - 145 mmol/L Final    Potassium 12/13/2022 3.4 (L)  3.5 - 5.1 mmol/L  Final    Chloride 12/13/2022 113 (H)  95 - 110 mmol/L Final    CO2 12/13/2022 21 (L)  23 - 29 mmol/L Final    Glucose 12/13/2022 122 (H)  70 - 110 mg/dL Final    BUN 12/13/2022 7  6 - 20 mg/dL Final    Creatinine 12/13/2022 1.0  0.5 - 1.4 mg/dL Final    Calcium 12/13/2022 8.6 (L)  8.7 - 10.5 mg/dL Final    Total Protein 12/13/2022 7.9  6.0 - 8.4 g/dL Final    Albumin 12/13/2022 3.6  3.5 - 5.2 g/dL Final    Total Bilirubin 12/13/2022 0.3  0.1 - 1.0 mg/dL Final    Alkaline Phosphatase 12/13/2022 89  55 - 135 U/L Final    AST 12/13/2022 24  10 - 40 U/L Final    ALT 12/13/2022 41  10 - 44 U/L Final    Anion Gap 12/13/2022 8  8 - 16 mmol/L Final    eGFR 12/13/2022 >60.0  >60 mL/min/1.73 m^2 Final    TSH 12/13/2022 2.260  0.400 - 4.000 uIU/mL Final    Specimen UA 12/13/2022 Urine, Clean Catch   Final    Color, UA 12/13/2022 Yellow  Yellow, Straw, [place] Final    Appearance, UA 12/13/2022 Clear  Clear Final    pH, UA 12/13/2022 7.0  5.0 - 8.0 Final    Specific Gravity, UA 12/13/2022 <=1.005 (A)  1.005 - 1.030 Final    Protein, UA 12/13/2022 Negative  Negative Final    Glucose, UA 12/13/2022 Negative  Negative Final    Ketones, UA 12/13/2022 Negative  Negative Final    Bilirubin (UA) 12/13/2022 Negative  Negative Final    Occult Blood UA 12/13/2022 Negative  Negative Final    Nitrite, UA 12/13/2022 Negative  Negative Final    Urobilinogen, UA 12/13/2022 Negative  <2.0 EU/dL Final    Leukocytes, UA 12/13/2022 Trace (A)  Negative Final    Benzodiazepines 12/13/2022 Negative  Negative Final    Methadone metabolites 12/13/2022 Negative  Negative Final    Cocaine (Metab.) 12/13/2022 Negative  Negative Final    Opiate Scrn, Ur 12/13/2022 Negative  Negative Final    Barbiturate Screen, Ur 12/13/2022 Negative  Negative Final    Amphetamine Screen, Ur 12/13/2022 Negative  Negative Final    THC 12/13/2022 Negative  Negative Final    Phencyclidine 12/13/2022 Negative  Negative Final    Creatinine, Urine 12/13/2022 40.2  15.0 - 325.0  mg/dL Final    Toxicology Information 12/13/2022 SEE COMMENT   Final    Alcohol, Serum 12/13/2022 172 (H)  <10 mg/dL Final    Acetaminophen (Tylenol), Serum 12/13/2022 <2.0 (L)  10.0 - 20.0 ug/mL Final    Preg Test, Ur 12/13/2022 Negative   Final    Salicylate Lvl 12/13/2022 <1.7 (L)  15.0 - 30.0 mg/dL Final    POC Rapid COVID 12/13/2022 Negative  Negative Final     Acceptable 12/13/2022 Yes   Final    RBC, UA 12/13/2022 2  0 - 4 /hpf Final    WBC, UA 12/13/2022 3  0 - 5 /hpf Final    Bacteria 12/13/2022 None  None-Occ /hpf Final    Yeast, UA 12/13/2022 None  None Final    Epithelial Casts, UA 12/13/2022 6 (A)  None /lpf Final    Microscopic Comment 12/13/2022 SEE COMMENT   Final    Alcohol, Serum 12/13/2022 137 (H)  <10 mg/dL Final    Hemoglobin A1C 12/13/2022 5.4  4.0 - 5.6 % Final    Estimated Avg Glucose 12/13/2022 108  68 - 131 mg/dL Final    Cholesterol 12/13/2022 203 (H)  120 - 199 mg/dL Final    Triglycerides 12/13/2022 228 (H)  30 - 150 mg/dL Final    HDL 12/13/2022 35 (L)  40 - 75 mg/dL Final    LDL Cholesterol 12/13/2022 122.4  63.0 - 159.0 mg/dL Final    HDL/Cholesterol Ratio 12/13/2022 17.2 (L)  20.0 - 50.0 % Final    Total Cholesterol/HDL Ratio 12/13/2022 5.8 (H)  2.0 - 5.0 Final    Non-HDL Cholesterol 12/13/2022 168  mg/dL Final         Discharged Condition: stable and improved; not currently a danger to self/others or gravely disabled    Disposition: Home or Self Care    Is patient being discharged on multiple neuroleptics? No    Follow Up/Patient Instructions:     Take all medications as prescribed.  Attend all psychiatric and medical follow up appointments.   Abstain from all drugs and alcohol.  Call the crisis line at: [phone] for help in a crisis and emergent situations or call 911 and Return to ED for any acute worsening of your condition including suicidal or homicidal ideations      No discharge procedures on file.        Follow up apt: local [facility]- see SW/discharge notes  for full detials      Medications:  Reconciled Home Medications:      Medication List        START taking these medications      FLUoxetine 40 MG capsule  Take 1 capsule (40 mg total) by mouth once daily.  Start taking on: December 20, 2022            STOP taking these medications      ARIPiprazole 10 MG disintegrating tablet  Commonly known as: ABILIFY     dextroamphetamine-amphetamine 10 MG 24 hr capsule  Commonly known as: ADDERALL XR     hydrOXYzine pamoate 25 MG Cap  Commonly known as: VISTARIL                Diet: regular     Activity as tolerated    Total time spent discharging patient: 35 minutes    Adonay Stark MD  Psychiatry

## 2022-12-19 NOTE — PLAN OF CARE
PSA: completed    Collateral: completed    Safety Plan: completed    Anticipated Discharge Date: 10/19/22    Anticipated Discharge Plan: [facility]

## 2022-12-19 NOTE — PLAN OF CARE
12/19/22 1017   Step 1: Warning Signs   Warning Sign 1 drinking with meds   Warning Sign 2 not talking to family   Warning Sign 3 not taking meds   Step 2: Internal coping strategies - Things I can do to take my mind off my problems without contacting another person:   Coping Strategy 1 spend time with family   Coping Strategy 2 go fishing   Coping Strategy 3 luly   Step 3: People and social settings that provide distraction:   1. Name Kirit Radford (dad)       Phone [phone]   2. Name Kait Barton       Phone [phone]   3. Place movies   4. Place park    Step 4: People whom I can ask for help:   1. Person Kirit Radford       Phone [phone]   2. Person Kait Devriese       Phone [phone]   Step 5: Professionals or agencies I can contact during a crisis:   1. Clinician Name [facility]       Phone [phone]   2. Clinician Name [place]       Phone [phone]   3. Suicide Prevention Lifeline: 2-136-627-TALK (0563) Suicide Prevention Lifeline 2-274-997-3748   4. [facility]       911   Step 6: Making the environment safe:   Safe environment 1 no drinking   Safe environment 2 remain calm and communicate with family

## 2023-03-16 ENCOUNTER — LAB VISIT (OUTPATIENT)
Dept: LAB | Facility: HOSPITAL | Age: 28
End: 2023-03-16
Attending: INTERNAL MEDICINE
Payer: MEDICARE

## 2023-03-16 DIAGNOSIS — R73.03 PREDIABETES: ICD-10-CM

## 2023-03-16 DIAGNOSIS — Z13.1 DIABETES MELLITUS SCREENING: ICD-10-CM

## 2023-03-16 DIAGNOSIS — Z13.29 SCREENING FOR THYROID DISORDER: ICD-10-CM

## 2023-03-16 DIAGNOSIS — Z00.00 WELLNESS EXAMINATION: ICD-10-CM

## 2023-03-16 DIAGNOSIS — Z13.0 SCREENING FOR DEFICIENCY ANEMIA: ICD-10-CM

## 2023-03-16 DIAGNOSIS — R79.9 ABNORMAL FINDING OF BLOOD CHEMISTRY, UNSPECIFIED: ICD-10-CM

## 2023-03-16 DIAGNOSIS — Z11.59 NEED FOR HEPATITIS C SCREENING TEST: ICD-10-CM

## 2023-03-16 DIAGNOSIS — Z87.42 HISTORY OF PCOS: ICD-10-CM

## 2023-03-16 DIAGNOSIS — Z11.59: ICD-10-CM

## 2023-03-16 DIAGNOSIS — Z11.4 ENCOUNTER FOR SCREENING FOR HIV: ICD-10-CM

## 2023-03-16 DIAGNOSIS — Z13.220 LIPID SCREENING: ICD-10-CM

## 2023-03-16 LAB
ALBUMIN SERPL BCP-MCNC: 3.5 G/DL (ref 3.5–5.2)
ALP SERPL-CCNC: 93 U/L (ref 55–135)
ALT SERPL W/O P-5'-P-CCNC: 47 U/L (ref 10–44)
ANION GAP SERPL CALC-SCNC: 1 MMOL/L (ref 8–16)
AST SERPL-CCNC: 23 U/L (ref 10–40)
BASOPHILS # BLD AUTO: 0.05 K/UL (ref 0–0.2)
BASOPHILS NFR BLD: 0.5 % (ref 0–1.9)
BILIRUB SERPL-MCNC: 0.3 MG/DL (ref 0.1–1)
BUN SERPL-MCNC: 9 MG/DL (ref 6–20)
CALCIUM SERPL-MCNC: 8.4 MG/DL (ref 8.7–10.5)
CHLORIDE SERPL-SCNC: 110 MMOL/L (ref 95–110)
CHOLEST SERPL-MCNC: 181 MG/DL (ref 120–199)
CHOLEST/HDLC SERPL: 4.8 {RATIO} (ref 2–5)
CO2 SERPL-SCNC: 29 MMOL/L (ref 23–29)
CREAT SERPL-MCNC: 0.8 MG/DL (ref 0.5–1.4)
DIFFERENTIAL METHOD BLD: ABNORMAL
EOSINOPHIL # BLD AUTO: 0.1 K/UL (ref 0–0.5)
EOSINOPHIL NFR BLD: 1.2 % (ref 0–8)
ERYTHROCYTE [DISTWIDTH] IN BLOOD BY AUTOMATED COUNT: 14.4 % (ref 11.5–14.5)
EST. GFR  (NO RACE VARIABLE): >60 ML/MIN/1.73 M^2
GLUCOSE SERPL-MCNC: 84 MG/DL (ref 70–110)
HCT VFR BLD AUTO: 39.7 % (ref 37–48.5)
HCV AB SERPL QL IA: NORMAL
HDLC SERPL-MCNC: 38 MG/DL (ref 40–75)
HDLC SERPL: 21 % (ref 20–50)
HGB BLD-MCNC: 12.6 G/DL (ref 12–16)
IMM GRANULOCYTES # BLD AUTO: 0.07 K/UL (ref 0–0.04)
IMM GRANULOCYTES NFR BLD AUTO: 0.7 % (ref 0–0.5)
INSULIN COLLECTION INTERVAL: 0
INSULIN SERPL-ACNC: 13.1 UU/ML
LDLC SERPL CALC-MCNC: 111 MG/DL (ref 63–159)
LYMPHOCYTES # BLD AUTO: 2.4 K/UL (ref 1–4.8)
LYMPHOCYTES NFR BLD: 23.2 % (ref 18–48)
MCH RBC QN AUTO: 26 PG (ref 27–31)
MCHC RBC AUTO-ENTMCNC: 31.7 G/DL (ref 32–36)
MCV RBC AUTO: 82 FL (ref 82–98)
MONOCYTES # BLD AUTO: 0.8 K/UL (ref 0.3–1)
MONOCYTES NFR BLD: 7.8 % (ref 4–15)
NEUTROPHILS # BLD AUTO: 6.9 K/UL (ref 1.8–7.7)
NEUTROPHILS NFR BLD: 66.6 % (ref 38–73)
NONHDLC SERPL-MCNC: 143 MG/DL
NRBC BLD-RTO: 0 /100 WBC
PLATELET # BLD AUTO: 370 K/UL (ref 150–450)
PMV BLD AUTO: 9.6 FL (ref 9.2–12.9)
POTASSIUM SERPL-SCNC: 3.8 MMOL/L (ref 3.5–5.1)
PROT SERPL-MCNC: 7.6 G/DL (ref 6–8.4)
RBC # BLD AUTO: 4.84 M/UL (ref 4–5.4)
SODIUM SERPL-SCNC: 140 MMOL/L (ref 136–145)
TESTOST SERPL-MCNC: 40 NG/DL (ref 5–73)
TRIGL SERPL-MCNC: 160 MG/DL (ref 30–150)
TSH SERPL DL<=0.005 MIU/L-ACNC: 3.49 UIU/ML (ref 0.4–4)
WBC # BLD AUTO: 10.37 K/UL (ref 3.9–12.7)

## 2023-03-16 PROCEDURE — 84403 ASSAY OF TOTAL TESTOSTERONE: CPT | Performed by: INTERNAL MEDICINE

## 2023-03-16 PROCEDURE — 83525 ASSAY OF INSULIN: CPT | Performed by: INTERNAL MEDICINE

## 2023-03-16 PROCEDURE — 80053 COMPREHEN METABOLIC PANEL: CPT | Performed by: INTERNAL MEDICINE

## 2023-03-16 PROCEDURE — 85025 COMPLETE CBC W/AUTO DIFF WBC: CPT | Performed by: INTERNAL MEDICINE

## 2023-03-16 PROCEDURE — 84443 ASSAY THYROID STIM HORMONE: CPT | Performed by: INTERNAL MEDICINE

## 2023-03-16 PROCEDURE — 87389 HIV-1 AG W/HIV-1&-2 AB AG IA: CPT | Performed by: INTERNAL MEDICINE

## 2023-03-16 PROCEDURE — 80061 LIPID PANEL: CPT | Performed by: INTERNAL MEDICINE

## 2023-03-16 PROCEDURE — 86696 HERPES SIMPLEX TYPE 2 TEST: CPT | Performed by: INTERNAL MEDICINE

## 2023-03-16 PROCEDURE — 86803 HEPATITIS C AB TEST: CPT | Performed by: INTERNAL MEDICINE

## 2023-03-17 LAB
HIV 1+2 AB+HIV1 P24 AG SERPL QL IA: NORMAL
HSV1 IGG SERPL QL IA: POSITIVE
HSV2 IGG SERPL QL IA: ABNORMAL

## 2023-03-26 PROBLEM — Z00.00 WELLNESS EXAMINATION: Status: ACTIVE | Noted: 2023-03-26

## 2023-05-09 PROBLEM — R10.9 ABDOMINAL PAIN: Status: ACTIVE | Noted: 2023-05-09

## 2023-05-09 PROBLEM — R10.13 DYSPEPSIA: Status: ACTIVE | Noted: 2023-05-09

## 2023-06-26 PROBLEM — Z00.00 WELLNESS EXAMINATION: Status: RESOLVED | Noted: 2023-03-26 | Resolved: 2023-06-26

## 2023-07-17 ENCOUNTER — HOSPITAL ENCOUNTER (EMERGENCY)
Facility: HOSPITAL | Age: 28
Discharge: HOME | End: 2023-07-17
Attending: STUDENT IN AN ORGANIZED HEALTH CARE EDUCATION/TRAINING PROGRAM
Payer: MEDICARE

## 2023-07-17 VITALS
HEART RATE: 69 BPM | BODY MASS INDEX: 48.95 KG/M2 | HEIGHT: 62 IN | DIASTOLIC BLOOD PRESSURE: 95 MMHG | RESPIRATION RATE: 14 BRPM | OXYGEN SATURATION: 99 % | SYSTOLIC BLOOD PRESSURE: 161 MMHG | TEMPERATURE: 99 F | WEIGHT: 266 LBS

## 2023-07-17 DIAGNOSIS — S46.912A STRAIN OF LEFT SHOULDER, INITIAL ENCOUNTER: Primary | ICD-10-CM

## 2023-07-17 LAB — B-HCG UR QL: NEGATIVE

## 2023-07-17 PROCEDURE — 81025 URINE PREGNANCY TEST: CPT

## 2023-07-17 PROCEDURE — 99284 EMERGENCY DEPT VISIT MOD MDM: CPT

## 2023-07-17 RX ORDER — IBUPROFEN 600 MG/1
600 TABLET ORAL EVERY 6 HOURS PRN
Qty: 20 TABLET | Refills: 0 | Status: SHIPPED | OUTPATIENT
Start: 2023-07-17

## 2023-07-17 RX ORDER — CYCLOBENZAPRINE HCL 10 MG
10 TABLET ORAL 3 TIMES DAILY PRN
Qty: 15 TABLET | Refills: 0 | Status: SHIPPED | OUTPATIENT
Start: 2023-07-17 | End: 2023-07-22

## 2023-07-17 NOTE — ED PROVIDER NOTES
Encounter Date: 7/17/2023       History     Chief Complaint   Patient presents with    Shoulder Pain     Patient reports pain to the left shoulder that radiates to her lower neck. Patient denies trauma to the area. Patient stated that she took a Tramadol at 1300 today. History of rotator cuff problems.      28-year-old female to ED with complaints of left shoulder pain that radiates to posterior neck.  She denies any trauma or injury.  Pain is worsened with movement.  She took tramadol without any improvement in symptoms.  She reports a history of rotator cuff injury.    The history is provided by the patient.   Review of patient's allergies indicates:   Allergen Reactions    Aspirin Other (See Comments) and Rash    Codeine sulfate Other (See Comments) and Nausea And Vomiting    Hydrocodone-acetaminophen Itching, Rash, Other (See Comments) and Nausea And Vomiting     Past Medical History:   Diagnosis Date    ADHD     Anxiety and depression     Narcolepsy     PCOS (polycystic ovarian syndrome)      No past surgical history on file.  Family History   Family history unknown: Yes     Social History     Tobacco Use    Smoking status: Every Day     Types: Vaping with nicotine    Smokeless tobacco: Never   Substance Use Topics    Alcohol use: Yes     Comment: occasionally drinks    Drug use: Never     Review of Systems   Constitutional:  Negative for fever.   HENT:  Negative for sore throat.    Eyes: Negative.    Respiratory:  Negative for shortness of breath.    Cardiovascular:  Negative for chest pain.   Gastrointestinal:  Negative for nausea.   Endocrine: Negative.    Genitourinary:  Negative for dysuria.   Musculoskeletal:  Positive for arthralgias (Left shoulder) and neck pain. Negative for back pain and neck stiffness.   Skin:  Negative for rash.   Allergic/Immunologic: Negative.    Neurological:  Negative for weakness.   Hematological:  Does not bruise/bleed easily.   Psychiatric/Behavioral: Negative.       Physical  Exam     Initial Vitals [07/17/23 1652]   BP Pulse Resp Temp SpO2   (!) 161/95 72 16 99 °F (37.2 °C) 99 %      MAP       --         Physical Exam    Nursing note and vitals reviewed.  Constitutional: She appears well-developed and well-nourished.   HENT:   Head: Normocephalic and atraumatic.   Eyes: EOM are normal.   Neck: Neck supple.   Normal range of motion.  Cardiovascular:  Normal rate and regular rhythm.           Pulmonary/Chest: Breath sounds normal. No respiratory distress. She has no wheezes.   Abdominal: She exhibits no distension.   Musculoskeletal:         General: Normal range of motion.      Cervical back: Normal range of motion and neck supple.     Neurological: She is alert and oriented to person, place, and time.   Skin: Skin is warm and dry.   Psychiatric: Thought content normal.       ED Course   Procedures  Labs Reviewed   PREGNANCY TEST, URINE RAPID    Narrative:     Specimen Source->Urine          Imaging Results    None          Medications - No data to display  Medical Decision Making:   Differential Diagnosis:   Shoulder sprain, shoulder strain, dislocation  Clinical Tests:   Lab Tests: Ordered and Reviewed  ED Management:  28-year-old female to ED for above complaints.  No obvious deformity noted.  No swelling noted.  No tenderness on exam.  Patient was full range of motion.   strength was equal bilaterally.  There was no reported injury or trauma.  Will treat as muscular strain with anti-inflammatories and muscle relaxers.  Patient to follow up with primary care.                        Clinical Impression:   Final diagnoses:  [S46.915V] Strain of left shoulder, initial encounter (Primary)        ED Disposition Condition    Discharge Stable          ED Prescriptions       Medication Sig Dispense Start Date End Date Auth. Provider    cyclobenzaprine (FLEXERIL) 10 MG tablet Take 1 tablet (10 mg total) by mouth 3 (three) times daily as needed. 15 tablet 7/17/2023 7/22/2023 Champ  DORA Noriega    ibuprofen (ADVIL,MOTRIN) 600 MG tablet Take 1 tablet (600 mg total) by mouth every 6 (six) hours as needed for Pain. 20 tablet 7/17/2023 -- Anibal Noriega NP          Follow-up Information       Follow up With Specialties Details Why Contact Info    Alicia Foley MD Internal Medicine In 3 days  [address] [facility] [address]  [phone]               Anibal Noriega NP  07/18/23 4409

## 2023-07-17 NOTE — DISCHARGE INSTRUCTIONS
Take the muscle relaxer with the antiinflammatory every 8 hours for pain. Try a heating pad over your traps to loosen the muscles. Follow up with primary care if symptoms do not improve.

## 2023-10-04 ENCOUNTER — PATIENT MESSAGE (OUTPATIENT)
Dept: ADMINISTRATIVE | Facility: OTHER | Age: 28
End: 2023-10-04
Payer: MEDICARE

## 2023-11-12 ENCOUNTER — HOSPITAL ENCOUNTER (EMERGENCY)
Facility: HOSPITAL | Age: 28
Discharge: HOME | End: 2023-11-12
Attending: EMERGENCY MEDICINE | Admitting: EMERGENCY MEDICINE
Payer: MEDICARE

## 2023-11-12 VITALS
RESPIRATION RATE: 18 BRPM | SYSTOLIC BLOOD PRESSURE: 137 MMHG | HEIGHT: 62 IN | WEIGHT: 268.63 LBS | DIASTOLIC BLOOD PRESSURE: 71 MMHG | OXYGEN SATURATION: 99 % | TEMPERATURE: 99 F | BODY MASS INDEX: 49.43 KG/M2 | HEART RATE: 92 BPM

## 2023-11-12 DIAGNOSIS — O20.0 THREATENED MISCARRIAGE: Primary | ICD-10-CM

## 2023-11-12 DIAGNOSIS — N93.9 VAGINAL BLEEDING: ICD-10-CM

## 2023-11-12 LAB
ALBUMIN SERPL BCP-MCNC: 3.4 G/DL (ref 3.5–5.2)
ALP SERPL-CCNC: 82 U/L (ref 55–135)
ALT SERPL W/O P-5'-P-CCNC: 33 U/L (ref 10–44)
ANION GAP SERPL CALC-SCNC: 11 MMOL/L (ref 8–16)
AST SERPL-CCNC: 22 U/L (ref 10–40)
BACTERIA #/AREA URNS AUTO: NORMAL /HPF
BASOPHILS # BLD AUTO: 0.06 K/UL (ref 0–0.2)
BASOPHILS NFR BLD: 0.7 % (ref 0–1.9)
BILIRUB SERPL-MCNC: 0.4 MG/DL (ref 0.1–1)
BILIRUB UR QL STRIP: NEGATIVE
BUN SERPL-MCNC: 5 MG/DL (ref 6–20)
CALCIUM SERPL-MCNC: 8.8 MG/DL (ref 8.7–10.5)
CHLORIDE SERPL-SCNC: 108 MMOL/L (ref 95–110)
CLARITY UR REFRACT.AUTO: CLEAR
CO2 SERPL-SCNC: 17 MMOL/L (ref 23–29)
COLOR UR AUTO: YELLOW
CREAT SERPL-MCNC: 0.7 MG/DL (ref 0.5–1.4)
DIFFERENTIAL METHOD: ABNORMAL
EOSINOPHIL # BLD AUTO: 0.2 K/UL (ref 0–0.5)
EOSINOPHIL NFR BLD: 2.4 % (ref 0–8)
ERYTHROCYTE [DISTWIDTH] IN BLOOD BY AUTOMATED COUNT: 15.8 % (ref 11.5–14.5)
EST. GFR  (NO RACE VARIABLE): >60 ML/MIN/1.73 M^2
GLUCOSE SERPL-MCNC: 98 MG/DL (ref 70–110)
GLUCOSE UR QL STRIP: NEGATIVE
HCG INTACT+B SERPL-ACNC: 1171 MIU/ML
HCT VFR BLD AUTO: 38.2 % (ref 37–48.5)
HGB BLD-MCNC: 12.2 G/DL (ref 12–16)
HGB UR QL STRIP: ABNORMAL
IMM GRANULOCYTES # BLD AUTO: 0.08 K/UL (ref 0–0.04)
IMM GRANULOCYTES NFR BLD AUTO: 0.9 % (ref 0–0.5)
KETONES UR QL STRIP: NEGATIVE
LEUKOCYTE ESTERASE UR QL STRIP: ABNORMAL
LYMPHOCYTES # BLD AUTO: 1.7 K/UL (ref 1–4.8)
LYMPHOCYTES NFR BLD: 19.5 % (ref 18–48)
MCH RBC QN AUTO: 25.4 PG (ref 27–31)
MCHC RBC AUTO-ENTMCNC: 31.9 G/DL (ref 32–36)
MCV RBC AUTO: 79 FL (ref 82–98)
MICROSCOPIC COMMENT: NORMAL
MONOCYTES # BLD AUTO: 1.1 K/UL (ref 0.3–1)
MONOCYTES NFR BLD: 13.1 % (ref 4–15)
NEUTROPHILS # BLD AUTO: 5.5 K/UL (ref 1.8–7.7)
NEUTROPHILS NFR BLD: 63.4 % (ref 38–73)
NITRITE UR QL STRIP: NEGATIVE
NRBC BLD-RTO: 0 /100 WBC
PH UR STRIP: 7 [PH] (ref 5–8)
PLATELET # BLD AUTO: 326 K/UL (ref 150–450)
PMV BLD AUTO: 9.1 FL (ref 9.2–12.9)
POTASSIUM SERPL-SCNC: 3.5 MMOL/L (ref 3.5–5.1)
PROT SERPL-MCNC: 7.3 G/DL (ref 6–8.4)
PROT UR QL STRIP: NEGATIVE
RBC # BLD AUTO: 4.81 M/UL (ref 4–5.4)
RBC #/AREA URNS AUTO: 1 /HPF (ref 0–4)
SODIUM SERPL-SCNC: 136 MMOL/L (ref 136–145)
SP GR UR STRIP: 1.01 (ref 1–1.03)
SQUAMOUS #/AREA URNS AUTO: 10 /HPF
URN SPEC COLLECT METH UR: ABNORMAL
UROBILINOGEN UR STRIP-ACNC: NEGATIVE EU/DL
WBC # BLD AUTO: 8.63 K/UL (ref 3.9–12.7)
WBC #/AREA URNS AUTO: 1 /HPF (ref 0–5)

## 2023-11-12 PROCEDURE — 84702 CHORIONIC GONADOTROPIN TEST: CPT | Mod: ER | Performed by: EMERGENCY MEDICINE

## 2023-11-12 PROCEDURE — 80053 COMPREHEN METABOLIC PANEL: CPT | Mod: ER | Performed by: EMERGENCY MEDICINE

## 2023-11-12 PROCEDURE — 81000 URINALYSIS NONAUTO W/SCOPE: CPT | Mod: ER | Performed by: EMERGENCY MEDICINE

## 2023-11-12 PROCEDURE — 85025 COMPLETE CBC W/AUTO DIFF WBC: CPT | Mod: ER | Performed by: EMERGENCY MEDICINE

## 2023-11-12 PROCEDURE — 99284 EMERGENCY DEPT VISIT MOD MDM: CPT | Mod: 25

## 2023-11-12 NOTE — ED PROVIDER NOTES
Encounter Date: 11/12/2023       History     Chief Complaint   Patient presents with    Cramping in pregnancy     7 weeks pregnant, bleeding into panty liners. OBGYN at [facility] Dr. Navarrete.     HPI  Pt is 7 weeks pregnant, OB services at [facility] Dr Navarrete. Pt reports mild vaginal bleeding associated c lower abdominal cramping for several days. Pt has not had an US for this pregnancy.    Review of patient's allergies indicates:   Allergen Reactions    Aspirin Other (See Comments) and Rash    Codeine sulfate Other (See Comments) and Nausea And Vomiting    Hydrocodone-acetaminophen Itching, Rash, Other (See Comments) and Nausea And Vomiting     Past Medical History:   Diagnosis Date    ADHD     Anxiety and depression     Narcolepsy     PCOS (polycystic ovarian syndrome)      Past Surgical History:   Procedure Laterality Date    Fibroid Removal  08/24/2023     Family History   Family history unknown: Yes     Social History     Tobacco Use    Smoking status: Former     Types: Vaping with nicotine    Smokeless tobacco: Never   Substance Use Topics    Alcohol use: Not Currently    Drug use: Never     Review of Systems   Constitutional:  Negative for fever.   HENT:  Negative for sore throat.    Respiratory:  Negative for shortness of breath.    Cardiovascular:  Negative for chest pain.   Gastrointestinal:  Negative for nausea.   Genitourinary:  Positive for pelvic pain and vaginal bleeding. Negative for dysuria.   Musculoskeletal:  Negative for back pain.   Skin:  Negative for rash.   Neurological:  Negative for weakness.   Hematological:  Does not bruise/bleed easily.       Physical Exam     Initial Vitals [11/12/23 1123]   BP Pulse Resp Temp SpO2   137/78 97 17 98.9 °F (37.2 °C) 99 %      MAP       --         Physical Exam    Nursing note and vitals reviewed.  Constitutional: She appears well-developed and well-nourished. No distress.   HENT:   Head: Normocephalic and atraumatic.   Mouth/Throat: Oropharynx is  clear and moist.   Eyes: Conjunctivae and EOM are normal. Pupils are equal, round, and reactive to light.   Neck: Neck supple.   Normal range of motion.  Cardiovascular:  Normal rate, regular rhythm and normal heart sounds.           Pulmonary/Chest: Breath sounds normal. No respiratory distress.   Abdominal: Abdomen is soft. Bowel sounds are normal. She exhibits no distension. There is no abdominal tenderness.   Genitourinary:    Pelvic exam was performed with patient supine.   Right adnexum displays no mass, no tenderness and no fullness. Left adnexum displays no mass, no tenderness and no fullness.    Vaginal bleeding present.      No vaginal discharge or tenderness.   There is bleeding in the vagina. No tenderness in the vagina.    No foreign body in the vagina.     Musculoskeletal:         General: Normal range of motion.      Cervical back: Normal range of motion and neck supple.     Neurological: She is alert and oriented to person, place, and time. She has normal strength.   Skin: Skin is warm and dry.   Psychiatric: She has a normal mood and affect. Thought content normal.         ED Course   Procedures  Labs Reviewed   CBC W/ AUTO DIFFERENTIAL - Abnormal; Notable for the following components:       Result Value    MCV 79 (*)     MCH 25.4 (*)     MCHC 31.9 (*)     RDW 15.8 (*)     MPV 9.1 (*)     Immature Granulocytes 0.9 (*)     Immature Grans (Abs) 0.08 (*)     Mono # 1.1 (*)     All other components within normal limits    Narrative:     Release to patient->Immediate   COMPREHENSIVE METABOLIC PANEL - Abnormal; Notable for the following components:    CO2 17 (*)     BUN 5 (*)     Albumin 3.4 (*)     All other components within normal limits    Narrative:     Release to patient->Immediate   URINALYSIS, REFLEX TO URINE CULTURE - Abnormal; Notable for the following components:    Occult Blood UA 3+ (*)     Leukocytes, UA 1+ (*)     All other components within normal limits    Narrative:     Specimen  Source->Urine   HCG, QUANTITATIVE    Narrative:     Release to patient->Immediate   URINALYSIS MICROSCOPIC    Narrative:     Specimen Source->Urine     Results for orders placed or performed during the hospital encounter of 11/12/23   CBC Auto Differential   Result Value Ref Range    WBC 8.63 3.90 - 12.70 K/uL    RBC 4.81 4.00 - 5.40 M/uL    Hemoglobin 12.2 12.0 - 16.0 g/dL    Hematocrit 38.2 37.0 - 48.5 %    MCV 79 (L) 82 - 98 fL    MCH 25.4 (L) 27.0 - 31.0 pg    MCHC 31.9 (L) 32.0 - 36.0 g/dL    RDW 15.8 (H) 11.5 - 14.5 %    Platelets 326 150 - 450 K/uL    MPV 9.1 (L) 9.2 - 12.9 fL    Immature Granulocytes 0.9 (H) 0.0 - 0.5 %    Gran # (ANC) 5.5 1.8 - 7.7 K/uL    Immature Grans (Abs) 0.08 (H) 0.00 - 0.04 K/uL    Lymph # 1.7 1.0 - 4.8 K/uL    Mono # 1.1 (H) 0.3 - 1.0 K/uL    Eos # 0.2 0.0 - 0.5 K/uL    Baso # 0.06 0.00 - 0.20 K/uL    nRBC 0 0 /100 WBC    Gran % 63.4 38.0 - 73.0 %    Lymph % 19.5 18.0 - 48.0 %    Mono % 13.1 4.0 - 15.0 %    Eosinophil % 2.4 0.0 - 8.0 %    Basophil % 0.7 0.0 - 1.9 %    Differential Method Automated    Comprehensive Metabolic Panel   Result Value Ref Range    Sodium 136 136 - 145 mmol/L    Potassium 3.5 3.5 - 5.1 mmol/L    Chloride 108 95 - 110 mmol/L    CO2 17 (L) 23 - 29 mmol/L    Glucose 98 70 - 110 mg/dL    BUN 5 (L) 6 - 20 mg/dL    Creatinine 0.7 0.5 - 1.4 mg/dL    Calcium 8.8 8.7 - 10.5 mg/dL    Total Protein 7.3 6.0 - 8.4 g/dL    Albumin 3.4 (L) 3.5 - 5.2 g/dL    Total Bilirubin 0.4 0.1 - 1.0 mg/dL    Alkaline Phosphatase 82 55 - 135 U/L    AST 22 10 - 40 U/L    ALT 33 10 - 44 U/L    eGFR >60.0 >60 mL/min/1.73 m^2    Anion Gap 11 8 - 16 mmol/L   hCG, quantitative, pregnancy   Result Value Ref Range    HCG Quant 1171 See Text mIU/mL   Urinalysis, Reflex to Urine Culture Urine, Clean Catch    Specimen: Urine   Result Value Ref Range    Specimen UA Urine, Clean Catch     Color, UA Yellow Yellow, Straw, Jesika    Appearance, UA Clear Clear    pH, UA 7.0 5.0 - 8.0    Specific  Gravity, UA 1.010 1.005 - 1.030    Protein, UA Negative Negative    Glucose, UA Negative Negative    Ketones, UA Negative Negative    Bilirubin (UA) Negative Negative    Occult Blood UA 3+ (A) Negative    Nitrite, UA Negative Negative    Urobilinogen, UA Negative <2.0 EU/dL    Leukocytes, UA 1+ (A) Negative   Urinalysis Microscopic   Result Value Ref Range    RBC, UA 1 0 - 4 /hpf    WBC, UA 1 0 - 5 /hpf    Bacteria Occasional None-Occ /hpf    Squam Epithel, UA 10 /hpf    Microscopic Comment SEE COMMENT             Imaging Results    None     Discussed need for OB US to rule out ectopic pregnancy/evaluate threatened miscarriage. We do not have US services at this time at this facility. Pt requests transfer to [facility] as this is her home town and where she receives her medical care.  13:30 PM [facility] has No OB services and [facility] has No US, pt agrees to be transferred to [facility] ED for US.  13:41 PM Discussed lab/imaging studies with patient and the need for further evaluation/admission for Threatened miscarriage. Pt verbalized understanding that this is a stand alone ER and we are unable to obtain US at this facility. Pt will be transferred to [facility] via [facility] with care en route to include mt. I discussed this case with hs and care was accepted by Dr Che.       Medications - No data to display  Medical Decision Making  Problems Addressed:  Threatened miscarriage: acute illness or injury    Amount and/or Complexity of Data Reviewed  Labs: ordered.     Details: HCG 1171                               Clinical Impression:   Final diagnoses:  [O20.0] Threatened miscarriage (Primary)        ED Disposition Condition    ED to ED Transfer Jerome Sadler MD  11/12/23 1622

## 2023-11-12 NOTE — ED PROVIDER NOTES
SCRIBE #1 NOTE: I, Ramesh Patel, am scribing for, and in the presence of, Ede Pitt MD. I have scribed the entire note.       History     Chief Complaint   Patient presents with    Cramping in pregnancy     7 weeks pregnant, bleeding into panty liners. OBGYN at [facility] Dr. Navarrete.     Review of patient's allergies indicates:   Allergen Reactions    Aspirin Other (See Comments) and Rash    Codeine sulfate Other (See Comments) and Nausea And Vomiting    Hydrocodone-acetaminophen Itching, Rash, Other (See Comments) and Nausea And Vomiting         History of Present Illness     HPI    11/12/2023, 3:07 PM  History obtained from the patient      History of Present Illness: Suze Holt is a 28 y.o. female patient with a PMHx of narcolepsy, ADHD, anxiety, depression, and PCOS who presents to the Emergency Department for evaluation of cramping in pregnancy. Pt reports she is 7 weeks pregnant with mild bleeding. OBGYN at [facility] is Dr. Navarrete. Symptoms are constant and moderate in severity. No mitigating or exacerbating factors reported. Associated sxs include pelvic pain. Patient denies any and all other sxs at this time. Pt was transferred from [facility] under the care of Dr. Jerome Livingston to obtain US, as pt has not had US done for this pregnancy. No further complaints or concerns at this time.       Arrival mode: Personal vehicle    PCP: Alicia Foley MD        Past Medical History:  Past Medical History:   Diagnosis Date    ADHD     Anxiety and depression     Narcolepsy     PCOS (polycystic ovarian syndrome)        Past Surgical History:  Past Surgical History:   Procedure Laterality Date    Fibroid Removal  08/24/2023         Family History:  Family History   Family history unknown: Yes       Social History:  Social History     Tobacco Use    Smoking status: Former     Types: Vaping with nicotine    Smokeless tobacco: Never   Substance and Sexual Activity    Alcohol use: Not  Currently    Drug use: Never    Sexual activity: Not Currently     Partners: Male     Birth control/protection: None        Review of Systems     Review of Systems   Constitutional:  Negative for fever.   HENT:  Negative for sore throat.    Respiratory:  Negative for shortness of breath.    Cardiovascular:  Negative for chest pain.   Gastrointestinal:  Negative for nausea.   Genitourinary:  Positive for pelvic pain and vaginal bleeding. Negative for dysuria.   Musculoskeletal:  Negative for back pain.   Skin:  Negative for rash.   Neurological:  Negative for weakness.   Hematological:  Does not bruise/bleed easily.      Physical Exam     Initial Vitals [11/12/23 1123]   BP Pulse Resp Temp SpO2   137/78 97 17 98.9 °F (37.2 °C) 99 %      MAP       --          Physical Exam  Nursing Notes and Vital Signs Reviewed.  Constitutional: Patient is in no acute distress. Well-developed and well-nourished.  Head: Atraumatic. Normocephalic.  Eyes: PERRL. EOM intact. Conjunctivae are not pale. No scleral icterus.  ENT: Mucous membranes are moist. Oropharynx is clear and symmetric.    Neck: Supple. Full ROM. No lymphadenopathy.  Cardiovascular: Regular rate. Regular rhythm. No murmurs, rubs, or gallops. Distal pulses are 2+ and symmetric.  Pulmonary/Chest: No respiratory distress. Clear to auscultation bilaterally. No wheezing or rales.  Abdominal: Soft and non-distended.  There is no tenderness.  No rebound, guarding, or rigidity. Good bowel sounds.  Genitourinary: No CVA tenderness. Vaginal bleeding present.  Musculoskeletal: Moves all extremities. No obvious deformities. No edema. No calf tenderness.  Skin: Warm and dry.  Neurological:  Alert, awake, and appropriate.  Normal speech.  No acute focal neurological deficits are appreciated.  Psychiatric: Normal affect. Good eye contact. Appropriate in content.     ED Course   Procedures  ED Vital Signs:  Vitals:    11/12/23 1122 11/12/23 1123 11/12/23 1330   BP:  137/78 137/71  "  Pulse:  97 92   Resp:  17 18   Temp:  98.9 °F (37.2 °C)    TempSrc:  Oral    SpO2:  99% 99%   Weight: 121.9 kg (268 lb 10.1 oz)     Height:  5' 2" (1.575 m)        Abnormal Lab Results:  Labs Reviewed   CBC W/ AUTO DIFFERENTIAL - Abnormal; Notable for the following components:       Result Value    MCV 79 (*)     MCH 25.4 (*)     MCHC 31.9 (*)     RDW 15.8 (*)     MPV 9.1 (*)     Immature Granulocytes 0.9 (*)     Immature Grans (Abs) 0.08 (*)     Mono # 1.1 (*)     All other components within normal limits    Narrative:     Release to patient->Immediate   COMPREHENSIVE METABOLIC PANEL - Abnormal; Notable for the following components:    CO2 17 (*)     BUN 5 (*)     Albumin 3.4 (*)     All other components within normal limits    Narrative:     Release to patient->Immediate   URINALYSIS, REFLEX TO URINE CULTURE - Abnormal; Notable for the following components:    Occult Blood UA 3+ (*)     Leukocytes, UA 1+ (*)     All other components within normal limits    Narrative:     Specimen Source->Urine   HCG, QUANTITATIVE    Narrative:     Release to patient->Immediate   URINALYSIS MICROSCOPIC    Narrative:     Specimen Source->Urine        All Lab Results:  Results for orders placed or performed during the hospital encounter of 11/12/23   CBC Auto Differential   Result Value Ref Range    WBC 8.63 3.90 - 12.70 K/uL    RBC 4.81 4.00 - 5.40 M/uL    Hemoglobin 12.2 12.0 - 16.0 g/dL    Hematocrit 38.2 37.0 - 48.5 %    MCV 79 (L) 82 - 98 fL    MCH 25.4 (L) 27.0 - 31.0 pg    MCHC 31.9 (L) 32.0 - 36.0 g/dL    RDW 15.8 (H) 11.5 - 14.5 %    Platelets 326 150 - 450 K/uL    MPV 9.1 (L) 9.2 - 12.9 fL    Immature Granulocytes 0.9 (H) 0.0 - 0.5 %    Gran # (ANC) 5.5 1.8 - 7.7 K/uL    Immature Grans (Abs) 0.08 (H) 0.00 - 0.04 K/uL    Lymph # 1.7 1.0 - 4.8 K/uL    Mono # 1.1 (H) 0.3 - 1.0 K/uL    Eos # 0.2 0.0 - 0.5 K/uL    Baso # 0.06 0.00 - 0.20 K/uL    nRBC 0 0 /100 WBC    Gran % 63.4 38.0 - 73.0 %    Lymph % 19.5 18.0 - 48.0 %    " Mono % 13.1 4.0 - 15.0 %    Eosinophil % 2.4 0.0 - 8.0 %    Basophil % 0.7 0.0 - 1.9 %    Differential Method Automated    Comprehensive Metabolic Panel   Result Value Ref Range    Sodium 136 136 - 145 mmol/L    Potassium 3.5 3.5 - 5.1 mmol/L    Chloride 108 95 - 110 mmol/L    CO2 17 (L) 23 - 29 mmol/L    Glucose 98 70 - 110 mg/dL    BUN 5 (L) 6 - 20 mg/dL    Creatinine 0.7 0.5 - 1.4 mg/dL    Calcium 8.8 8.7 - 10.5 mg/dL    Total Protein 7.3 6.0 - 8.4 g/dL    Albumin 3.4 (L) 3.5 - 5.2 g/dL    Total Bilirubin 0.4 0.1 - 1.0 mg/dL    Alkaline Phosphatase 82 55 - 135 U/L    AST 22 10 - 40 U/L    ALT 33 10 - 44 U/L    eGFR >60.0 >60 mL/min/1.73 m^2    Anion Gap 11 8 - 16 mmol/L   hCG, quantitative, pregnancy   Result Value Ref Range    HCG Quant 1171 See Text mIU/mL   Urinalysis, Reflex to Urine Culture Urine, Clean Catch    Specimen: Urine   Result Value Ref Range    Specimen UA Urine, Clean Catch     Color, UA Yellow Yellow, Straw, Jesika    Appearance, UA Clear Clear    pH, UA 7.0 5.0 - 8.0    Specific Gravity, UA 1.010 1.005 - 1.030    Protein, UA Negative Negative    Glucose, UA Negative Negative    Ketones, UA Negative Negative    Bilirubin (UA) Negative Negative    Occult Blood UA 3+ (A) Negative    Nitrite, UA Negative Negative    Urobilinogen, UA Negative <2.0 EU/dL    Leukocytes, UA 1+ (A) Negative   Urinalysis Microscopic   Result Value Ref Range    RBC, UA 1 0 - 4 /hpf    WBC, UA 1 0 - 5 /hpf    Bacteria Occasional None-Occ /hpf    Squam Epithel, UA 10 /hpf    Microscopic Comment SEE COMMENT         Imaging Results:  Imaging Results              US OB <14 Wks TransAbd & TransVag, Single Gestation (XPD) (Final result)  Result time 11/12/23 15:38:15   Procedure changed from US OB <14 Wks, TransAbd, Single Gestation     Final result by Marcus Herndon MD (11/12/23 15:38:15)                   Impression:      Empty uterus with thickened endometrium.  Nonvisualization of the right and of the left ovaries.   Follow-up suggested.      Electronically signed by: Marcus Herndon MD  Date:    11/12/2023  Time:    15:38               Narrative:    EXAMINATION:  US OB <14 WEEKS, TRANSABDOM & TRANSVAG, SINGLE GESTATION (XPD)    CLINICAL HISTORY:  vaginal bleeding; Abnormal uterine and vaginal bleeding, unspecified    TECHNIQUE:  Transabdominal sonography of the pelvis was performed, followed by transvaginal sonography to better evaluate the uterus and ovaries.    COMPARISON:  None.    FINDINGS:  Complete abdominal ultrasound with transabdominal and endovaginal ultrasound.  Limited examination    Uterus appears normal in size at 4.9 x 5.8 x 8.8 cm.  Endometrium is thickened at 19 mm.  No IUP is seen.    Neither the left nor the right ovary were seen.  No free fluid.                                            The Emergency Provider reviewed the vital signs and test results, which are outlined above.     ED Discussion       3:47 PM: Reassessed pt at this time. Discussed with pt all pertinent ED information and results. Discussed pt dx and plan of tx. Gave pt all f/u and return to the ED instructions. All questions and concerns were addressed at this time. Pt expresses understanding of information and instructions, and is comfortable with plan to discharge. Pt is stable for discharge.    I discussed with patient and/or family/caretaker that evaluation in the ED does not suggest any emergent or life threatening medical conditions requiring immediate intervention beyond what was provided in the ED, and I believe patient is safe for discharge.  Regardless, an unremarkable evaluation in the ED does not preclude the development or presence of a serious of life threatening condition. As such, patient was instructed to return immediately for any worsening or change in current symptoms.        Medical Decision Making  DDx: miscarriage, ectopic    Amount and/or Complexity of Data Reviewed  Labs: ordered. Decision-making details documented  in ED Course.  Radiology: ordered. Decision-making details documented in ED Course.                ED Medication(s):  Medications - No data to display    Discharge Medication List as of 11/12/2023  3:45 PM           Follow-up Information       OB/GYN In 2 days.    Contact information:  [phone]                               Scribe Attestation:   Scribe #1: I performed the above scribed service and the documentation accurately describes the services I performed. I attest to the accuracy of the note.     Attending:   Physician Attestation Statement for Scribe #1: I, Ede Pitt MD, personally performed the services described in this documentation, as scribed by Ramesh Patel, in my presence, and it is both accurate and complete.           Clinical Impression       ICD-10-CM ICD-9-CM   1. Threatened miscarriage  O20.0 640.00   2. Vaginal bleeding  N93.9 623.8       Disposition:   Disposition: Discharged  Condition: Stable        Ede Pitt MD  11/13/23 0943

## 2024-02-11 ENCOUNTER — HOSPITAL ENCOUNTER (EMERGENCY)
Facility: HOSPITAL | Age: 29
Discharge: HOME | End: 2024-02-11
Attending: EMERGENCY MEDICINE
Payer: MEDICARE

## 2024-02-11 ENCOUNTER — HOSPITAL ENCOUNTER (INPATIENT)
Facility: HOSPITAL | Age: 29
LOS: 2 days | Discharge: HOME | DRG: 398 | End: 2024-02-13
Attending: HOSPITALIST | Admitting: SURGERY
Payer: MEDICARE

## 2024-02-11 VITALS
OXYGEN SATURATION: 96 % | WEIGHT: 270 LBS | SYSTOLIC BLOOD PRESSURE: 99 MMHG | HEART RATE: 81 BPM | BODY MASS INDEX: 49.69 KG/M2 | DIASTOLIC BLOOD PRESSURE: 56 MMHG | RESPIRATION RATE: 18 BRPM | TEMPERATURE: 98 F | HEIGHT: 62 IN

## 2024-02-11 DIAGNOSIS — K35.30 ACUTE APPENDICITIS WITH LOCALIZED PERITONITIS, WITHOUT PERFORATION, ABSCESS, OR GANGRENE: Primary | ICD-10-CM

## 2024-02-11 DIAGNOSIS — E66.01 SEVERE OBESITY (BMI >= 40): ICD-10-CM

## 2024-02-11 DIAGNOSIS — N30.90 CYSTITIS: ICD-10-CM

## 2024-02-11 DIAGNOSIS — F33.2 MDD (MAJOR DEPRESSIVE DISORDER), RECURRENT SEVERE, WITHOUT PSYCHOSIS: ICD-10-CM

## 2024-02-11 DIAGNOSIS — K35.80 ACUTE APPENDICITIS: ICD-10-CM

## 2024-02-11 LAB
ALBUMIN SERPL BCP-MCNC: 3.2 G/DL (ref 3.5–5.2)
ALP SERPL-CCNC: 86 U/L (ref 55–135)
ALT SERPL W/O P-5'-P-CCNC: 57 U/L (ref 10–44)
ANION GAP SERPL CALC-SCNC: 3 MMOL/L (ref 3–11)
AST SERPL-CCNC: 23 U/L (ref 10–40)
B-HCG UR QL: NEGATIVE
BACTERIA #/AREA URNS HPF: ABNORMAL /HPF
BASOPHILS # BLD AUTO: 0.06 K/UL (ref 0–0.2)
BASOPHILS NFR BLD: 0.4 % (ref 0–1.9)
BILIRUB SERPL-MCNC: 0.4 MG/DL (ref 0.1–1)
BILIRUB UR QL STRIP: NEGATIVE
BUN SERPL-MCNC: 10 MG/DL (ref 6–20)
CALCIUM SERPL-MCNC: 8.6 MG/DL (ref 8.7–10.5)
CHLORIDE SERPL-SCNC: 110 MMOL/L (ref 95–110)
CLARITY UR: ABNORMAL
CO2 SERPL-SCNC: 27 MMOL/L (ref 23–29)
COLOR UR: YELLOW
CREAT SERPL-MCNC: 0.8 MG/DL (ref 0.5–1.4)
CRP SERPL-MCNC: 1.32 MG/DL (ref 0–0.75)
DIFFERENTIAL METHOD BLD: ABNORMAL
EOSINOPHIL # BLD AUTO: 0.1 K/UL (ref 0–0.5)
EOSINOPHIL NFR BLD: 0.3 % (ref 0–8)
ERYTHROCYTE [DISTWIDTH] IN BLOOD BY AUTOMATED COUNT: 16.1 % (ref 11.5–14.5)
EST. GFR  (NO RACE VARIABLE): >60 ML/MIN/1.73 M^2
GLUCOSE SERPL-MCNC: 106 MG/DL (ref 70–110)
GLUCOSE UR QL STRIP: NEGATIVE
HCT VFR BLD AUTO: 38.8 % (ref 37–48.5)
HGB BLD-MCNC: 12.1 G/DL (ref 12–16)
HGB UR QL STRIP: NEGATIVE
HYALINE CASTS #/AREA URNS LPF: 2.5 /LPF
IMM GRANULOCYTES # BLD AUTO: 0.1 K/UL (ref 0–0.04)
IMM GRANULOCYTES NFR BLD AUTO: 0.7 % (ref 0–0.5)
KETONES UR QL STRIP: NEGATIVE
LACTATE SERPL-SCNC: 1 MMOL/L (ref 0.5–2.2)
LEUKOCYTE ESTERASE UR QL STRIP: ABNORMAL
LYMPHOCYTES # BLD AUTO: 1.9 K/UL (ref 1–4.8)
LYMPHOCYTES NFR BLD: 12.8 % (ref 18–48)
MCH RBC QN AUTO: 25.1 PG (ref 27–31)
MCHC RBC AUTO-ENTMCNC: 31.2 G/DL (ref 32–36)
MCV RBC AUTO: 80 FL (ref 82–98)
MICROSCOPIC COMMENT: ABNORMAL
MONOCYTES # BLD AUTO: 1.2 K/UL (ref 0.3–1)
MONOCYTES NFR BLD: 8.3 % (ref 4–15)
NEUTROPHILS # BLD AUTO: 11.6 K/UL (ref 1.8–7.7)
NEUTROPHILS NFR BLD: 77.5 % (ref 38–73)
NITRITE UR QL STRIP: NEGATIVE
NRBC BLD-RTO: 0 /100 WBC
OTHER ELEMENTS URNS MICRO: ABNORMAL
PH UR STRIP: 6 [PH] (ref 5–8)
PLATELET # BLD AUTO: 346 K/UL (ref 150–450)
PMV BLD AUTO: 9.3 FL (ref 9.2–12.9)
POTASSIUM SERPL-SCNC: 3.4 MMOL/L (ref 3.5–5.1)
PROT SERPL-MCNC: 7 G/DL (ref 6–8.4)
PROT UR QL STRIP: NEGATIVE
RBC # BLD AUTO: 4.83 M/UL (ref 4–5.4)
RBC #/AREA URNS HPF: 0 /HPF (ref 0–4)
SODIUM SERPL-SCNC: 140 MMOL/L (ref 136–145)
SP GR UR STRIP: >=1.03 (ref 1–1.03)
SQUAMOUS #/AREA URNS HPF: 9 /HPF
URN SPEC COLLECT METH UR: ABNORMAL
UROBILINOGEN UR STRIP-ACNC: 1 EU/DL
WBC # BLD AUTO: 14.95 K/UL (ref 3.9–12.7)
WBC #/AREA URNS HPF: 18 /HPF (ref 0–5)

## 2024-02-11 PROCEDURE — 63600175 PHARM REV CODE 636 W HCPCS: Performed by: EMERGENCY MEDICINE

## 2024-02-11 PROCEDURE — 85025 COMPLETE CBC W/AUTO DIFF WBC: CPT | Performed by: NURSE PRACTITIONER

## 2024-02-11 PROCEDURE — 81000 URINALYSIS NONAUTO W/SCOPE: CPT | Performed by: NURSE PRACTITIONER

## 2024-02-11 PROCEDURE — 96361 HYDRATE IV INFUSION ADD-ON: CPT

## 2024-02-11 PROCEDURE — 96365 THER/PROPH/DIAG IV INF INIT: CPT

## 2024-02-11 PROCEDURE — 36415 COLL VENOUS BLD VENIPUNCTURE: CPT | Mod: XB | Performed by: EMERGENCY MEDICINE

## 2024-02-11 PROCEDURE — 25000003 PHARM REV CODE 250: Performed by: NURSE PRACTITIONER

## 2024-02-11 PROCEDURE — 87086 URINE CULTURE/COLONY COUNT: CPT | Performed by: NURSE PRACTITIONER

## 2024-02-11 PROCEDURE — 63600175 PHARM REV CODE 636 W HCPCS: Performed by: NURSE PRACTITIONER

## 2024-02-11 PROCEDURE — 96375 TX/PRO/DX INJ NEW DRUG ADDON: CPT

## 2024-02-11 PROCEDURE — 86140 C-REACTIVE PROTEIN: CPT | Performed by: EMERGENCY MEDICINE

## 2024-02-11 PROCEDURE — 11000001 HC ACUTE MED/SURG PRIVATE ROOM

## 2024-02-11 PROCEDURE — 25500020 PHARM REV CODE 255: Performed by: EMERGENCY MEDICINE

## 2024-02-11 PROCEDURE — 99285 EMERGENCY DEPT VISIT HI MDM: CPT | Mod: 25

## 2024-02-11 PROCEDURE — 81025 URINE PREGNANCY TEST: CPT | Performed by: NURSE PRACTITIONER

## 2024-02-11 PROCEDURE — 96376 TX/PRO/DX INJ SAME DRUG ADON: CPT

## 2024-02-11 PROCEDURE — 36415 COLL VENOUS BLD VENIPUNCTURE: CPT | Performed by: NURSE PRACTITIONER

## 2024-02-11 PROCEDURE — 80053 COMPREHEN METABOLIC PANEL: CPT | Performed by: NURSE PRACTITIONER

## 2024-02-11 PROCEDURE — 83605 ASSAY OF LACTIC ACID: CPT | Performed by: EMERGENCY MEDICINE

## 2024-02-11 RX ORDER — SODIUM CHLORIDE 9 MG/ML
INJECTION, SOLUTION INTRAVENOUS
Status: DISCONTINUED | OUTPATIENT
Start: 2024-02-11 | End: 2024-02-11 | Stop reason: HOSPADM

## 2024-02-11 RX ORDER — DEXTROMETHORPHAN/PSEUDOEPHED 2.5-7.5/.8
160 DROPS ORAL
Status: COMPLETED | OUTPATIENT
Start: 2024-02-11 | End: 2024-02-11

## 2024-02-11 RX ORDER — HYDROMORPHONE HYDROCHLORIDE 1 MG/ML
1 INJECTION, SOLUTION INTRAMUSCULAR; INTRAVENOUS; SUBCUTANEOUS
Status: COMPLETED | OUTPATIENT
Start: 2024-02-11 | End: 2024-02-11

## 2024-02-11 RX ORDER — KETOROLAC TROMETHAMINE 30 MG/ML
15 INJECTION, SOLUTION INTRAMUSCULAR; INTRAVENOUS
Status: COMPLETED | OUTPATIENT
Start: 2024-02-11 | End: 2024-02-11

## 2024-02-11 RX ORDER — ONDANSETRON HYDROCHLORIDE 2 MG/ML
4 INJECTION, SOLUTION INTRAVENOUS
Status: COMPLETED | OUTPATIENT
Start: 2024-02-11 | End: 2024-02-11

## 2024-02-11 RX ORDER — HYDROMORPHONE HYDROCHLORIDE 1 MG/ML
1 INJECTION, SOLUTION INTRAMUSCULAR; INTRAVENOUS; SUBCUTANEOUS EVERY 4 HOURS PRN
Status: DISCONTINUED | OUTPATIENT
Start: 2024-02-11 | End: 2024-02-11 | Stop reason: HOSPADM

## 2024-02-11 RX ORDER — DICYCLOMINE HYDROCHLORIDE 10 MG/1
20 CAPSULE ORAL
Status: COMPLETED | OUTPATIENT
Start: 2024-02-11 | End: 2024-02-11

## 2024-02-11 RX ADMIN — SODIUM CHLORIDE: 9 INJECTION, SOLUTION INTRAVENOUS at 06:02

## 2024-02-11 RX ADMIN — Medication 160 MG: at 02:02

## 2024-02-11 RX ADMIN — ONDANSETRON 4 MG: 2 INJECTION INTRAMUSCULAR; INTRAVENOUS at 04:02

## 2024-02-11 RX ADMIN — PIPERACILLIN SODIUM AND TAZOBACTAM SODIUM 4.5 G: 4; .5 INJECTION, POWDER, FOR SOLUTION INTRAVENOUS at 06:02

## 2024-02-11 RX ADMIN — ONDANSETRON 4 MG: 2 INJECTION INTRAMUSCULAR; INTRAVENOUS at 06:02

## 2024-02-11 RX ADMIN — IOHEXOL 100 ML: 350 INJECTION, SOLUTION INTRAVENOUS at 04:02

## 2024-02-11 RX ADMIN — HYDROMORPHONE HYDROCHLORIDE 1 MG: 1 INJECTION, SOLUTION INTRAMUSCULAR; INTRAVENOUS; SUBCUTANEOUS at 08:02

## 2024-02-11 RX ADMIN — SODIUM CHLORIDE 1000 ML: 9 INJECTION, SOLUTION INTRAVENOUS at 04:02

## 2024-02-11 RX ADMIN — HYDROMORPHONE HYDROCHLORIDE 1 MG: 1 INJECTION, SOLUTION INTRAMUSCULAR; INTRAVENOUS; SUBCUTANEOUS at 04:02

## 2024-02-11 RX ADMIN — KETOROLAC TROMETHAMINE 15 MG: 30 INJECTION, SOLUTION INTRAMUSCULAR; INTRAVENOUS at 03:02

## 2024-02-11 RX ADMIN — DICYCLOMINE HYDROCHLORIDE 20 MG: 10 CAPSULE ORAL at 02:02

## 2024-02-11 NOTE — ED PROVIDER NOTES
Encounter Date: 2/11/2024       History     Chief Complaint   Patient presents with    Abdominal Pain     Pt stated that she has been experiencing sharp abdominal cramping since earlier this morning. Denied n/v/d. Last BM today and normal. Denied dysuria.      This is a 28-year-old white female with a history of anxiety, uterine fibroid removal performed 6 months ago, who presents to the emergency department with complaints lower abdominal cramping pain that began this morning and has been relatively constant throughout the day.  She denies appetite changes, fever, nausea/vomiting, dysuria, vaginal discharge, diarrhea, or constipation.  Last normal bowel movement was this morning.      Review of patient's allergies indicates:   Allergen Reactions    Aspirin Other (See Comments) and Rash    Codeine sulfate Other (See Comments) and Nausea And Vomiting    Hydrocodone-acetaminophen Itching, Rash, Other (See Comments) and Nausea And Vomiting     Past Medical History:   Diagnosis Date    ADHD     Anxiety and depression     Narcolepsy     PCOS (polycystic ovarian syndrome)      Past Surgical History:   Procedure Laterality Date    Fibroid Removal  08/24/2023     Family History   Family history unknown: Yes     Social History     Tobacco Use    Smoking status: Former     Types: Vaping with nicotine    Smokeless tobacco: Never   Substance Use Topics    Alcohol use: Not Currently    Drug use: Never     Review of Systems   Constitutional:  Negative for appetite change and fever.   Gastrointestinal:  Positive for abdominal pain. Negative for blood in stool, constipation, diarrhea, nausea and vomiting.   Genitourinary:  Negative for dysuria and vaginal discharge.       Physical Exam     Initial Vitals [02/11/24 1334]   BP Pulse Resp Temp SpO2   (!) 128/56 100 20 98.5 °F (36.9 °C) 98 %      MAP       --         Physical Exam    Nursing note and vitals reviewed.  Constitutional: She appears well-developed and well-nourished. She  is active. No distress.   HENT:   Head: Normocephalic and atraumatic.   Eyes: EOM are normal. Pupils are equal, round, and reactive to light.   Neck: Neck supple.   Normal range of motion.  Cardiovascular:  Normal rate, regular rhythm and normal heart sounds.           Pulmonary/Chest: Breath sounds normal. No respiratory distress.   Abdominal: Abdomen is soft. Bowel sounds are normal. She exhibits no distension. There is no abdominal tenderness.   Musculoskeletal:         General: Normal range of motion.      Cervical back: Normal range of motion and neck supple.     Neurological: She is alert and oriented to person, place, and time. GCS score is 15. GCS eye subscore is 4. GCS verbal subscore is 5. GCS motor subscore is 6.   Skin: Skin is warm and dry. Capillary refill takes less than 2 seconds.   Psychiatric: She has a normal mood and affect. Her behavior is normal. Thought content normal.         ED Course   Procedures  Labs Reviewed   CBC W/ AUTO DIFFERENTIAL - Abnormal; Notable for the following components:       Result Value    WBC 14.95 (*)     MCV 80 (*)     MCH 25.1 (*)     MCHC 31.2 (*)     RDW 16.1 (*)     Immature Granulocytes 0.7 (*)     Gran # (ANC) 11.6 (*)     Immature Grans (Abs) 0.10 (*)     Mono # 1.2 (*)     Gran % 77.5 (*)     Lymph % 12.8 (*)     All other components within normal limits   COMPREHENSIVE METABOLIC PANEL - Abnormal; Notable for the following components:    Potassium 3.4 (*)     Calcium 8.6 (*)     Albumin 3.2 (*)     ALT 57 (*)     All other components within normal limits   URINALYSIS, REFLEX TO URINE CULTURE - Abnormal; Notable for the following components:    Appearance, UA Cloudy (*)     Specific Gravity, UA >=1.030 (*)     Leukocytes, UA 1+ (*)     All other components within normal limits    Narrative:     Preferred Collection Type->Urine, Clean Catch  Specimen Source->Urine   URINALYSIS MICROSCOPIC - Abnormal; Notable for the following components:    WBC, UA 18 (*)      Hyaline Casts, UA 2.5 (*)     Other (U/A) Moderate (*)     All other components within normal limits    Narrative:     Preferred Collection Type->Urine, Clean Catch  Specimen Source->Urine   C-REACTIVE PROTEIN - Abnormal; Notable for the following components:    CRP 1.32 (*)     All other components within normal limits   CULTURE, URINE   PREGNANCY TEST, URINE RAPID    Narrative:     Specimen Source->Urine   C-REACTIVE PROTEIN   LACTIC ACID, PLASMA   LACTIC ACID, PLASMA          Imaging Results              CT Abdomen Pelvis With IV Contrast NO Oral Contrast (Final result)  Result time 02/11/24 18:06:20      Final result by Danyel Odonnell MD (02/11/24 18:06:20)                   Impression:      Findings described above consistent with mild changes of acute appendicitis.      Electronically signed by: Danyel Odonnell MD  Date:    02/11/2024  Time:    18:06               Narrative:    EXAMINATION:  CT ABDOMEN PELVIS WITH IV CONTRAST    CLINICAL HISTORY:  Sharp abdominal pain and cramping abdominal abscess/infection suspected;    TECHNIQUE:  Axial CT images were obtained from the lung bases through the pelvis after intravenous administration of contrast.  Oral contrast not administered.  Multiplanar reconstructions evaluated.  Iterative reconstruction technique was used.  CT/Cardiac nuclear examinations in the past 12 months: 0    COMPARISON:  No priors available    FINDINGS:  Mild dependent atelectatic changes in the lower lobes.  Liver, spleen, adrenals, pancreas, gallbladder, and kidneys are unremarkable.  Visualized distal esophagus and stomach are unremarkable.  No urinary bladder wall thickening.  1.6 cm cyst right ovary.  No detected adenopathy, free air, or free fluid.  No bowel wall thickening or pattern of bowel obstruction.  Proximal aspect of the appendix is dilated measuring up to 1.1 cm in diameter demonstrating circumferential wall thickening with mild adjacent inflammatory change.  No acute osseous  change.                                       Medications   piperacillin-tazobactam (ZOSYN) 4.5 g in dextrose 5 % in water (D5W) 100 mL IVPB (MB+) (4.5 g Intravenous New Bag 2/11/24 1833)   0.9%  NaCl infusion ( Intravenous New Bag 2/11/24 1832)   dicyclomine capsule 20 mg (20 mg Oral Given 2/11/24 1427)   simethicone 40 mg/0.6 mL drops 160 mg (160 mg Oral Given 2/11/24 1427)   ketorolac injection 15 mg (15 mg Intravenous Given 2/11/24 1550)   iohexoL (OMNIPAQUE 350) injection 100 mL (100 mLs Intravenous Given 2/11/24 1624)   sodium chloride 0.9% bolus 1,000 mL 1,000 mL (0 mLs Intravenous Stopped 2/11/24 1749)   HYDROmorphone injection 1 mg (1 mg Intravenous Given 2/11/24 1649)   ondansetron injection 4 mg (4 mg Intravenous Given 2/11/24 1650)   ondansetron injection 4 mg (4 mg Intravenous Given 2/11/24 1832)     Medical Decision Making  Amount and/or Complexity of Data Reviewed  Labs: ordered.  Radiology: ordered.    Risk  OTC drugs.  Prescription drug management.               ED Course as of 02/11/24 1857   Sun Feb 11, 2024   1823 Labs remarkable for white blood cell count 73548, CRP 1.3.  Urinalysis reveals occasional bacteria in the presence of leukocytes, however squamous epithelials present and patient not experiencing dysuria.  For this reason CT abdomen pelvis was obtained to rule out abdominal infection.  Imaging reveals evidence of acute appendicitis.  Report given to attending and antibiotics initiated. [CB]      ED Course User Index  [CB] Lani Rahman NP               Medical Decision Making:   ED Management:  Patient seen and examined by me as well, all responsibility has been taken over for this patient, clinical exam consistent with abdominal pain, CT scan reveals acute appendicitis, will place a transfer request and await call back.  Physical exam is normal except for mild abdominal tenderness, with nausea.  No other issues.  Vital signs are stable.  Stable for transfer    Nathen Nina,  MD             Clinical Impression:  Final diagnoses:  [K35.30] Acute appendicitis with localized peritonitis, without perforation, abscess, or gangrene (Primary)          ED Disposition Condition    Transfer to Another Facility Stable                Nathen Nina MD  02/11/24 0119

## 2024-02-12 ENCOUNTER — ANESTHESIA (OUTPATIENT)
Dept: SURGERY | Facility: HOSPITAL | Age: 29
DRG: 398 | End: 2024-02-12
Payer: MEDICARE

## 2024-02-12 ENCOUNTER — ANESTHESIA EVENT (OUTPATIENT)
Dept: SURGERY | Facility: HOSPITAL | Age: 29
DRG: 398 | End: 2024-02-12
Payer: MEDICARE

## 2024-02-12 PROBLEM — K35.80 ACUTE APPENDICITIS: Status: ACTIVE | Noted: 2024-02-12

## 2024-02-12 LAB
ALBUMIN SERPL BCP-MCNC: 2.9 G/DL (ref 3.5–5.2)
ALP SERPL-CCNC: 69 U/L (ref 55–135)
ALT SERPL W/O P-5'-P-CCNC: 36 U/L (ref 10–44)
ANION GAP SERPL CALC-SCNC: 10 MMOL/L (ref 8–16)
AST SERPL-CCNC: 20 U/L (ref 10–40)
BACTERIA UR CULT: NORMAL
BACTERIA UR CULT: NORMAL
BASOPHILS # BLD AUTO: 0.03 K/UL (ref 0–0.2)
BASOPHILS NFR BLD: 0.4 % (ref 0–1.9)
BILIRUB SERPL-MCNC: 0.7 MG/DL (ref 0.1–1)
BUN SERPL-MCNC: 6 MG/DL (ref 6–20)
CALCIUM SERPL-MCNC: 8 MG/DL (ref 8.7–10.5)
CHLORIDE SERPL-SCNC: 111 MMOL/L (ref 95–110)
CO2 SERPL-SCNC: 16 MMOL/L (ref 23–29)
CREAT SERPL-MCNC: 0.7 MG/DL (ref 0.5–1.4)
DIFFERENTIAL METHOD BLD: ABNORMAL
EOSINOPHIL # BLD AUTO: 0.1 K/UL (ref 0–0.5)
EOSINOPHIL NFR BLD: 1 % (ref 0–8)
ERYTHROCYTE [DISTWIDTH] IN BLOOD BY AUTOMATED COUNT: 16.2 % (ref 11.5–14.5)
EST. GFR  (NO RACE VARIABLE): >60 ML/MIN/1.73 M^2
GLUCOSE SERPL-MCNC: 78 MG/DL (ref 70–110)
HCT VFR BLD AUTO: 33.7 % (ref 37–48.5)
HGB BLD-MCNC: 10.8 G/DL (ref 12–16)
IMM GRANULOCYTES # BLD AUTO: 0.03 K/UL (ref 0–0.04)
IMM GRANULOCYTES NFR BLD AUTO: 0.4 % (ref 0–0.5)
LYMPHOCYTES # BLD AUTO: 2.4 K/UL (ref 1–4.8)
LYMPHOCYTES NFR BLD: 30.5 % (ref 18–48)
MCH RBC QN AUTO: 25.5 PG (ref 27–31)
MCHC RBC AUTO-ENTMCNC: 32 G/DL (ref 32–36)
MCV RBC AUTO: 80 FL (ref 82–98)
MONOCYTES # BLD AUTO: 0.7 K/UL (ref 0.3–1)
MONOCYTES NFR BLD: 9.5 % (ref 4–15)
NEUTROPHILS # BLD AUTO: 4.5 K/UL (ref 1.8–7.7)
NEUTROPHILS NFR BLD: 58.2 % (ref 38–73)
NRBC BLD-RTO: 0 /100 WBC
PLATELET # BLD AUTO: 284 K/UL (ref 150–450)
PMV BLD AUTO: 9.1 FL (ref 9.2–12.9)
POTASSIUM SERPL-SCNC: 3.8 MMOL/L (ref 3.5–5.1)
PROT SERPL-MCNC: 6 G/DL (ref 6–8.4)
RBC # BLD AUTO: 4.24 M/UL (ref 4–5.4)
SODIUM SERPL-SCNC: 137 MMOL/L (ref 136–145)
WBC # BLD AUTO: 7.78 K/UL (ref 3.9–12.7)

## 2024-02-12 PROCEDURE — 44970 LAPAROSCOPY APPENDECTOMY: CPT | Mod: ,,,

## 2024-02-12 PROCEDURE — 36000711: Performed by: SURGERY

## 2024-02-12 PROCEDURE — 71000033 HC RECOVERY, INTIAL HOUR: Performed by: SURGERY

## 2024-02-12 PROCEDURE — 36000710: Performed by: SURGERY

## 2024-02-12 PROCEDURE — 25000003 PHARM REV CODE 250

## 2024-02-12 PROCEDURE — 44970 LAPAROSCOPY APPENDECTOMY: CPT | Mod: ,,, | Performed by: SURGERY

## 2024-02-12 PROCEDURE — 8E0W4CZ ROBOTIC ASSISTED PROCEDURE OF TRUNK REGION, PERCUTANEOUS ENDOSCOPIC APPROACH: ICD-10-PCS | Performed by: SURGERY

## 2024-02-12 PROCEDURE — 3E0T3BZ INTRODUCTION OF ANESTHETIC AGENT INTO PERIPHERAL NERVES AND PLEXI, PERCUTANEOUS APPROACH: ICD-10-PCS | Performed by: SURGERY

## 2024-02-12 PROCEDURE — 63600175 PHARM REV CODE 636 W HCPCS: Performed by: SURGERY

## 2024-02-12 PROCEDURE — 25000003 PHARM REV CODE 250: Performed by: ANESTHESIOLOGY

## 2024-02-12 PROCEDURE — 85025 COMPLETE CBC W/AUTO DIFF WBC: CPT | Performed by: SURGERY

## 2024-02-12 PROCEDURE — 37000009 HC ANESTHESIA EA ADD 15 MINS: Performed by: SURGERY

## 2024-02-12 PROCEDURE — 37000008 HC ANESTHESIA 1ST 15 MINUTES: Performed by: SURGERY

## 2024-02-12 PROCEDURE — 63600175 PHARM REV CODE 636 W HCPCS: Mod: JZ,JG | Performed by: SURGERY

## 2024-02-12 PROCEDURE — 99222 1ST HOSP IP/OBS MODERATE 55: CPT | Mod: 57,,,

## 2024-02-12 PROCEDURE — 27201423 OPTIME MED/SURG SUP & DEVICES STERILE SUPPLY: Performed by: SURGERY

## 2024-02-12 PROCEDURE — 36415 COLL VENOUS BLD VENIPUNCTURE: CPT | Mod: XB | Performed by: SURGERY

## 2024-02-12 PROCEDURE — 11000001 HC ACUTE MED/SURG PRIVATE ROOM

## 2024-02-12 PROCEDURE — 88304 TISSUE EXAM BY PATHOLOGIST: CPT | Mod: 26,,, | Performed by: PATHOLOGY

## 2024-02-12 PROCEDURE — 80053 COMPREHEN METABOLIC PANEL: CPT | Performed by: SURGERY

## 2024-02-12 PROCEDURE — 0DTJ4ZZ RESECTION OF APPENDIX, PERCUTANEOUS ENDOSCOPIC APPROACH: ICD-10-PCS | Performed by: SURGERY

## 2024-02-12 PROCEDURE — 71000039 HC RECOVERY, EACH ADD'L HOUR: Performed by: SURGERY

## 2024-02-12 PROCEDURE — 63600175 PHARM REV CODE 636 W HCPCS

## 2024-02-12 PROCEDURE — 25000003 PHARM REV CODE 250: Performed by: SURGERY

## 2024-02-12 PROCEDURE — 88304 TISSUE EXAM BY PATHOLOGIST: CPT | Performed by: PATHOLOGY

## 2024-02-12 RX ORDER — DIPHENHYDRAMINE HYDROCHLORIDE 50 MG/ML
25 INJECTION INTRAMUSCULAR; INTRAVENOUS EVERY 6 HOURS PRN
Status: DISCONTINUED | OUTPATIENT
Start: 2024-02-12 | End: 2024-02-13 | Stop reason: HOSPADM

## 2024-02-12 RX ORDER — OXYCODONE HYDROCHLORIDE 5 MG/1
10 TABLET ORAL EVERY 6 HOURS PRN
Status: DISCONTINUED | OUTPATIENT
Start: 2024-02-12 | End: 2024-02-13 | Stop reason: HOSPADM

## 2024-02-12 RX ORDER — PHENYLEPHRINE HYDROCHLORIDE 10 MG/ML
INJECTION INTRAVENOUS
Status: DISCONTINUED | OUTPATIENT
Start: 2024-02-12 | End: 2024-02-12

## 2024-02-12 RX ORDER — SUCCINYLCHOLINE CHLORIDE 20 MG/ML
INJECTION INTRAMUSCULAR; INTRAVENOUS
Status: DISCONTINUED | OUTPATIENT
Start: 2024-02-12 | End: 2024-02-12

## 2024-02-12 RX ORDER — OXYCODONE HYDROCHLORIDE 5 MG/1
5 TABLET ORAL
Status: DISCONTINUED | OUTPATIENT
Start: 2024-02-12 | End: 2024-02-12 | Stop reason: HOSPADM

## 2024-02-12 RX ORDER — PROPOFOL 10 MG/ML
VIAL (ML) INTRAVENOUS
Status: DISCONTINUED | OUTPATIENT
Start: 2024-02-12 | End: 2024-02-12

## 2024-02-12 RX ORDER — CIPROFLOXACIN 2 MG/ML
400 INJECTION, SOLUTION INTRAVENOUS
Status: DISCONTINUED | OUTPATIENT
Start: 2024-02-12 | End: 2024-02-13

## 2024-02-12 RX ORDER — METRONIDAZOLE 500 MG/100ML
500 INJECTION, SOLUTION INTRAVENOUS
Status: DISCONTINUED | OUTPATIENT
Start: 2024-02-12 | End: 2024-02-13 | Stop reason: HOSPADM

## 2024-02-12 RX ORDER — MEPERIDINE HYDROCHLORIDE 25 MG/ML
12.5 INJECTION INTRAMUSCULAR; INTRAVENOUS; SUBCUTANEOUS ONCE
Status: DISCONTINUED | OUTPATIENT
Start: 2024-02-12 | End: 2024-02-12 | Stop reason: HOSPADM

## 2024-02-12 RX ORDER — PROCHLORPERAZINE EDISYLATE 5 MG/ML
5 INJECTION INTRAMUSCULAR; INTRAVENOUS EVERY 30 MIN PRN
Status: DISCONTINUED | OUTPATIENT
Start: 2024-02-12 | End: 2024-02-12 | Stop reason: HOSPADM

## 2024-02-12 RX ORDER — SODIUM CHLORIDE 0.9 % (FLUSH) 0.9 %
3 SYRINGE (ML) INJECTION
Status: DISCONTINUED | OUTPATIENT
Start: 2024-02-12 | End: 2024-02-13 | Stop reason: HOSPADM

## 2024-02-12 RX ORDER — METHYLPHENIDATE HYDROCHLORIDE 5 MG/1
20 TABLET ORAL 2 TIMES DAILY WITH MEALS
Status: DISCONTINUED | OUTPATIENT
Start: 2024-02-12 | End: 2024-02-13 | Stop reason: HOSPADM

## 2024-02-12 RX ORDER — ALBUTEROL SULFATE 0.83 MG/ML
2.5 SOLUTION RESPIRATORY (INHALATION) EVERY 4 HOURS PRN
Status: DISCONTINUED | OUTPATIENT
Start: 2024-02-12 | End: 2024-02-12 | Stop reason: HOSPADM

## 2024-02-12 RX ORDER — MIDAZOLAM HYDROCHLORIDE 1 MG/ML
INJECTION INTRAMUSCULAR; INTRAVENOUS
Status: DISCONTINUED | OUTPATIENT
Start: 2024-02-12 | End: 2024-02-12

## 2024-02-12 RX ORDER — DEXAMETHASONE SODIUM PHOSPHATE 4 MG/ML
INJECTION, SOLUTION INTRA-ARTICULAR; INTRALESIONAL; INTRAMUSCULAR; INTRAVENOUS; SOFT TISSUE
Status: DISCONTINUED | OUTPATIENT
Start: 2024-02-12 | End: 2024-02-12

## 2024-02-12 RX ORDER — CHLORHEXIDINE GLUCONATE ORAL RINSE 1.2 MG/ML
10 SOLUTION DENTAL 2 TIMES DAILY
Status: DISCONTINUED | OUTPATIENT
Start: 2024-02-12 | End: 2024-02-13 | Stop reason: HOSPADM

## 2024-02-12 RX ORDER — SODIUM CHLORIDE 0.9 % (FLUSH) 0.9 %
10 SYRINGE (ML) INJECTION
Status: DISCONTINUED | OUTPATIENT
Start: 2024-02-12 | End: 2024-02-13 | Stop reason: HOSPADM

## 2024-02-12 RX ORDER — LIDOCAINE HYDROCHLORIDE 10 MG/ML
1 INJECTION, SOLUTION EPIDURAL; INFILTRATION; INTRACAUDAL; PERINEURAL ONCE AS NEEDED
Status: DISCONTINUED | OUTPATIENT
Start: 2024-02-12 | End: 2024-02-12

## 2024-02-12 RX ORDER — HYDROMORPHONE HYDROCHLORIDE 2 MG/ML
1 INJECTION, SOLUTION INTRAMUSCULAR; INTRAVENOUS; SUBCUTANEOUS EVERY 4 HOURS PRN
Status: DISCONTINUED | OUTPATIENT
Start: 2024-02-12 | End: 2024-02-13 | Stop reason: HOSPADM

## 2024-02-12 RX ORDER — HYDROMORPHONE HYDROCHLORIDE 2 MG/ML
0.2 INJECTION, SOLUTION INTRAMUSCULAR; INTRAVENOUS; SUBCUTANEOUS EVERY 5 MIN PRN
Status: DISCONTINUED | OUTPATIENT
Start: 2024-02-12 | End: 2024-02-12 | Stop reason: HOSPADM

## 2024-02-12 RX ORDER — LIDOCAINE HYDROCHLORIDE 20 MG/ML
INJECTION INTRAVENOUS
Status: DISCONTINUED | OUTPATIENT
Start: 2024-02-12 | End: 2024-02-12

## 2024-02-12 RX ORDER — ENOXAPARIN SODIUM 100 MG/ML
40 INJECTION SUBCUTANEOUS EVERY 12 HOURS
Status: DISCONTINUED | OUTPATIENT
Start: 2024-02-13 | End: 2024-02-13 | Stop reason: HOSPADM

## 2024-02-12 RX ORDER — FENTANYL CITRATE 50 UG/ML
INJECTION, SOLUTION INTRAMUSCULAR; INTRAVENOUS
Status: DISCONTINUED | OUTPATIENT
Start: 2024-02-12 | End: 2024-02-12

## 2024-02-12 RX ORDER — SODIUM CHLORIDE, SODIUM LACTATE, POTASSIUM CHLORIDE, CALCIUM CHLORIDE 600; 310; 30; 20 MG/100ML; MG/100ML; MG/100ML; MG/100ML
INJECTION, SOLUTION INTRAVENOUS CONTINUOUS
Status: DISCONTINUED | OUTPATIENT
Start: 2024-02-12 | End: 2024-02-13 | Stop reason: HOSPADM

## 2024-02-12 RX ORDER — ONDANSETRON 8 MG/1
8 TABLET, ORALLY DISINTEGRATING ORAL EVERY 8 HOURS PRN
Status: DISCONTINUED | OUTPATIENT
Start: 2024-02-12 | End: 2024-02-13 | Stop reason: HOSPADM

## 2024-02-12 RX ORDER — METOCLOPRAMIDE HYDROCHLORIDE 5 MG/ML
5 INJECTION INTRAMUSCULAR; INTRAVENOUS EVERY 6 HOURS PRN
Status: DISCONTINUED | OUTPATIENT
Start: 2024-02-12 | End: 2024-02-13 | Stop reason: HOSPADM

## 2024-02-12 RX ORDER — BUPIVACAINE HYDROCHLORIDE 2.5 MG/ML
INJECTION, SOLUTION EPIDURAL; INFILTRATION; INTRACAUDAL
Status: DISCONTINUED | OUTPATIENT
Start: 2024-02-12 | End: 2024-02-12 | Stop reason: HOSPADM

## 2024-02-12 RX ORDER — DIPHENHYDRAMINE HYDROCHLORIDE 50 MG/ML
25 INJECTION INTRAMUSCULAR; INTRAVENOUS EVERY 6 HOURS PRN
Status: DISCONTINUED | OUTPATIENT
Start: 2024-02-12 | End: 2024-02-12 | Stop reason: HOSPADM

## 2024-02-12 RX ORDER — FLUOXETINE HYDROCHLORIDE 20 MG/1
40 CAPSULE ORAL DAILY
Status: DISCONTINUED | OUTPATIENT
Start: 2024-02-12 | End: 2024-02-13 | Stop reason: HOSPADM

## 2024-02-12 RX ORDER — OXYCODONE HYDROCHLORIDE 5 MG/1
5 TABLET ORAL EVERY 6 HOURS PRN
Status: DISCONTINUED | OUTPATIENT
Start: 2024-02-12 | End: 2024-02-13 | Stop reason: HOSPADM

## 2024-02-12 RX ORDER — TALC
6 POWDER (GRAM) TOPICAL NIGHTLY PRN
Status: DISCONTINUED | OUTPATIENT
Start: 2024-02-12 | End: 2024-02-13 | Stop reason: HOSPADM

## 2024-02-12 RX ORDER — ROCURONIUM BROMIDE 10 MG/ML
INJECTION, SOLUTION INTRAVENOUS
Status: DISCONTINUED | OUTPATIENT
Start: 2024-02-12 | End: 2024-02-12

## 2024-02-12 RX ORDER — ONDANSETRON HYDROCHLORIDE 2 MG/ML
INJECTION, SOLUTION INTRAVENOUS
Status: DISCONTINUED | OUTPATIENT
Start: 2024-02-12 | End: 2024-02-12

## 2024-02-12 RX ORDER — ONDANSETRON HYDROCHLORIDE 2 MG/ML
4 INJECTION, SOLUTION INTRAVENOUS DAILY PRN
Status: DISCONTINUED | OUTPATIENT
Start: 2024-02-12 | End: 2024-02-12 | Stop reason: HOSPADM

## 2024-02-12 RX ORDER — SCOLOPAMINE TRANSDERMAL SYSTEM 1 MG/1
1 PATCH, EXTENDED RELEASE TRANSDERMAL
Status: DISCONTINUED | OUTPATIENT
Start: 2024-02-12 | End: 2024-02-13 | Stop reason: HOSPADM

## 2024-02-12 RX ADMIN — OXYCODONE HYDROCHLORIDE 10 MG: 5 TABLET ORAL at 04:02

## 2024-02-12 RX ADMIN — ROCURONIUM BROMIDE 10 MG: 10 SOLUTION INTRAVENOUS at 01:02

## 2024-02-12 RX ADMIN — DIPHENHYDRAMINE HYDROCHLORIDE 25 MG: 50 INJECTION, SOLUTION INTRAMUSCULAR; INTRAVENOUS at 08:02

## 2024-02-12 RX ADMIN — FLUOXETINE 40 MG: 20 CAPSULE ORAL at 09:02

## 2024-02-12 RX ADMIN — FENTANYL CITRATE 25 MCG: 50 INJECTION, SOLUTION INTRAMUSCULAR; INTRAVENOUS at 01:02

## 2024-02-12 RX ADMIN — SCOPOLAMINE 1 PATCH: 1.5 PATCH, EXTENDED RELEASE TRANSDERMAL at 12:02

## 2024-02-12 RX ADMIN — PIPERACILLIN SODIUM AND TAZOBACTAM SODIUM 4.5 G: 4; .5 INJECTION, POWDER, FOR SOLUTION INTRAVENOUS at 05:02

## 2024-02-12 RX ADMIN — SUCCINYLCHOLINE CHLORIDE 180 MG: 20 INJECTION, SOLUTION INTRAMUSCULAR; INTRAVENOUS; PARENTERAL at 01:02

## 2024-02-12 RX ADMIN — SODIUM CHLORIDE, POTASSIUM CHLORIDE, SODIUM LACTATE AND CALCIUM CHLORIDE: 600; 310; 30; 20 INJECTION, SOLUTION INTRAVENOUS at 12:02

## 2024-02-12 RX ADMIN — PROPOFOL 20 MG: 10 INJECTION, EMULSION INTRAVENOUS at 02:02

## 2024-02-12 RX ADMIN — SUGAMMADEX 200 MG: 100 INJECTION, SOLUTION INTRAVENOUS at 02:02

## 2024-02-12 RX ADMIN — HYDROMORPHONE HYDROCHLORIDE 1 MG: 2 INJECTION INTRAMUSCULAR; INTRAVENOUS; SUBCUTANEOUS at 10:02

## 2024-02-12 RX ADMIN — SODIUM CHLORIDE, POTASSIUM CHLORIDE, SODIUM LACTATE AND CALCIUM CHLORIDE: 600; 310; 30; 20 INJECTION, SOLUTION INTRAVENOUS at 11:02

## 2024-02-12 RX ADMIN — DEXAMETHASONE SODIUM PHOSPHATE 4 MG: 4 INJECTION, SOLUTION INTRA-ARTICULAR; INTRALESIONAL; INTRAMUSCULAR; INTRAVENOUS; SOFT TISSUE at 01:02

## 2024-02-12 RX ADMIN — LIDOCAINE HYDROCHLORIDE 100 MG: 20 INJECTION INTRAVENOUS at 01:02

## 2024-02-12 RX ADMIN — GLYCOPYRROLATE 0.2 MG: 0.2 INJECTION, SOLUTION INTRAMUSCULAR; INTRAVENOUS at 01:02

## 2024-02-12 RX ADMIN — CIPROFLOXACIN 400 MG: 2 INJECTION, SOLUTION INTRAVENOUS at 09:02

## 2024-02-12 RX ADMIN — ROCURONIUM BROMIDE 30 MG: 10 SOLUTION INTRAVENOUS at 01:02

## 2024-02-12 RX ADMIN — PIPERACILLIN SODIUM AND TAZOBACTAM SODIUM 4.5 G: 4; .5 INJECTION, POWDER, FOR SOLUTION INTRAVENOUS at 09:02

## 2024-02-12 RX ADMIN — SODIUM CHLORIDE, POTASSIUM CHLORIDE, SODIUM LACTATE AND CALCIUM CHLORIDE: 600; 310; 30; 20 INJECTION, SOLUTION INTRAVENOUS at 05:02

## 2024-02-12 RX ADMIN — PROPOFOL 20 MG: 10 INJECTION, EMULSION INTRAVENOUS at 01:02

## 2024-02-12 RX ADMIN — METOCLOPRAMIDE 5 MG: 5 INJECTION, SOLUTION INTRAMUSCULAR; INTRAVENOUS at 02:02

## 2024-02-12 RX ADMIN — SODIUM CHLORIDE, POTASSIUM CHLORIDE, SODIUM LACTATE AND CALCIUM CHLORIDE: 600; 310; 30; 20 INJECTION, SOLUTION INTRAVENOUS at 01:02

## 2024-02-12 RX ADMIN — PIPERACILLIN SODIUM AND TAZOBACTAM SODIUM 4.5 G: 4; .5 INJECTION, POWDER, FOR SOLUTION INTRAVENOUS at 02:02

## 2024-02-12 RX ADMIN — FENTANYL CITRATE 50 MCG: 50 INJECTION, SOLUTION INTRAMUSCULAR; INTRAVENOUS at 01:02

## 2024-02-12 RX ADMIN — METRONIDAZOLE 500 MG: 5 INJECTION, SOLUTION INTRAVENOUS at 08:02

## 2024-02-12 RX ADMIN — SODIUM CHLORIDE, POTASSIUM CHLORIDE, SODIUM LACTATE AND CALCIUM CHLORIDE: 600; 310; 30; 20 INJECTION, SOLUTION INTRAVENOUS at 04:02

## 2024-02-12 RX ADMIN — MIDAZOLAM HYDROCHLORIDE 2 MG: 1 INJECTION, SOLUTION INTRAMUSCULAR; INTRAVENOUS at 12:02

## 2024-02-12 RX ADMIN — PROPOFOL 130 MG: 10 INJECTION, EMULSION INTRAVENOUS at 01:02

## 2024-02-12 RX ADMIN — ROCURONIUM BROMIDE 5 MG: 10 SOLUTION INTRAVENOUS at 01:02

## 2024-02-12 RX ADMIN — CHLORHEXIDINE GLUCONATE 0.12% ORAL RINSE 10 ML: 1.2 LIQUID ORAL at 08:02

## 2024-02-12 RX ADMIN — OXYCODONE HYDROCHLORIDE 10 MG: 5 TABLET ORAL at 02:02

## 2024-02-12 RX ADMIN — ONDANSETRON 4 MG: 2 INJECTION INTRAMUSCULAR; INTRAVENOUS at 01:02

## 2024-02-12 RX ADMIN — OXYCODONE HYDROCHLORIDE 10 MG: 5 TABLET ORAL at 09:02

## 2024-02-12 RX ADMIN — PHENYLEPHRINE HYDROCHLORIDE 100 MCG: 10 INJECTION INTRAVENOUS at 01:02

## 2024-02-12 NOTE — OP NOTE
[facility] - Surgery ([facility])  Surgery Department  Operative Note    SUMMARY     Date of Procedure: 2/12/2024     Procedure: Procedure(s) (LRB):  XI ROBOTIC APPENDECTOMY (N/A)  BLOCK, TRANSVERSUS ABDOMINIS PLANE (N/A)     Surgeon(s) and Role:     * Kirit Barroso MD - Primary        Masha Alvarado PA-C     Assisting Surgeon: None    Pre-Operative Diagnosis: Acute appendicitis [K35.80]    Post-Operative Diagnosis: Post-Op Diagnosis Codes:     * Acute appendicitis [K35.80]    Anesthesia: General    Operative Findings (including complications, if any):     Inflamed appendix    Description of Technical Procedures:     Patient was brought into the operating room placed on the operative table in a supine position.  General endotracheal anesthesia was induced.  A Thomas catheter was inserted.  Antibiotics were being administered.  Pneumatic compressions on lower extremities.  Her arms were tucked at her side.  The abdomen was clipped, prepped and draped in the standard fashion.      A time-out was performed.      Patient was placed in reverse Trendelenburg position rolled to her right.  A small incision was made below the left ribs along the midclavicular line.  The fascia was identified and elevated with Kocher clamps.  A Veress needle was inserted and its position confirmed.  Pneumoperitoneum was established to 15 mmHg.  An 8 mm robotic trocar was placed.  There was no evidence of visceral or vascular injury.      Abdominal wall tap block was performed by infiltrating Marcaine into the layers abdominal wall along the left abdomen at the planned site for trocar placement        Patient had an additional 8 mm robotic trocar placed in the mid abdomen and a 12 mm robotic trocar placed in the left abdomen.  Left side    The patient was then placed in Trendelenburg position and rolled to her left.  She was docked the operating robot.      The cecum was identified and the tinea were traced to their confluence in the appendix  was found.  Was noted to be mildly inflamed with some purulent material around it.  Suction  was used to remove the purulent material in the pelvis was irrigated and purulent material was removed as well.      The appendix was then elevated and a window was created at the appendiceal base with the robotic curved dissector.  The appendix was then divided from the cecum using a Sure form 45 stapler.  The mesoappendix was likewise divided with a Sure form 45 stapler with a vascular load.      Hemostasis along the mesoappendix staple line was ensured with bipolar cautery.      Additional irrigation was carried out.      The area of the appendectomy was inspected there was no bleeding noted.      The appendix was placed in an Endo-Catch bag.  The patient was undocked from the operating robot.  The appendix was extracted.      A 12 mm port site had the fascia closed with 0 Vicryl in an interrupted fashion x2 using a suture Passer.    At the 12 mm port site the deep layers were closed with 3-0 Vicryl interrupted fashion.      All skin incisions were closed with 4-0 Monocryl.  Dermabond was placed.  Patient tolerated procedure well.  Final counts were correct    Significant Surgical Tasks Conducted by the Assistant(s), if Applicable:  Assistance with instrument exchange and camera manipulation fired a robot docked    Estimated Blood Loss (EBL): 15 mL  IV fluids 1 L   Marcaine 0.25% 30 mL           Implants: * No implants in log *    Specimens:   Specimen (24h ago, onward)       Start     Ordered    02/12/24 1356  Specimen to Pathology, Surgery General Surgery  Once        Comments: Pre-op Diagnosis: Acute appendicitis [K35.80]Procedure(s):XI ROBOTIC APPENDECTOMY Number of specimens: 1Name of specimens: 1. Appendix - PERM     References:    Click here for ordering Quick Tip   Question Answer Comment   Procedure Type: General Surgery    Specimen Class: Routine/Screening    Which provider would you like to cc?  ABBEY FLOWERS    Release to patient Immediate        02/12/24 7789                            Condition: Stable    Disposition: PACU - hemodynamically stable.    Attestation: I performed the procedure.

## 2024-02-12 NOTE — TRANSFER OF CARE
"Anesthesia Transfer of Care Note    Patient: Suze Holt    Procedure(s) Performed: Procedure(s) (LRB):  XI ROBOTIC APPENDECTOMY (N/A)  BLOCK, TRANSVERSUS ABDOMINIS PLANE (N/A)    Patient location: PACU    Anesthesia Type: general    Transport from OR: Transported from OR on room air with adequate spontaneous ventilation    Post pain: adequate analgesia    Post assessment: no apparent anesthetic complications and tolerated procedure well    Post vital signs: stable    Level of consciousness: responds to stimulation and awake    Nausea/Vomiting: no nausea/vomiting    Complications: none    Transfer of care protocol was followedComments: Report given to PACU RN at bedside. Hand off tool used. RN given opportunity to ask questions or clarify concerns. No Concerns verbalized. RN was asked if ready to assume care of patient. RN verbally confirmed. Pt. left in stable condition. SV. Vital Signs Return to Near Baseline. No s/s of distress noted.       Last vitals: Visit Vitals  /77   Pulse 71   Temp 36.9 °C (98.4 °F)   Resp 18   Ht 5' 2" (1.575 m)   Wt 122.5 kg (270 lb)   LMP 01/25/2024 (Approximate)   SpO2 95%   Breastfeeding No   BMI 49.38 kg/m²     "

## 2024-02-12 NOTE — HPI
29 y/o female with past medical history of depression and morbid obesity who presented to the ED for evaluation of right lower quadrant abdominal pain onset yesterday morning around 9 am and worsening until presentation to the ED. Associated nausea but no vomiting. Describes the pain as sharp. Denies fevers and chills. Past abdominal surgical history includes lap myomectomy last year. CT performed in the ED concerning for early acute appendicitis. WBC elevated at 14 on admission.

## 2024-02-12 NOTE — ANESTHESIA PROCEDURE NOTES
Intubation    Date/Time: 2/12/2024 1:06 PM    Performed by: Hillary Diane CRNA  Authorized by: Moo Sterling MD    Intubation:     Induction:  Intravenous    Intubated:  Postinduction    Mask Ventilation:  Easy with oral airway    Attempts:  1    Attempted By:  CRNA    Method of Intubation:  Video laryngoscopy    Blade:  [place] 3    Laryngeal View Grade: Grade I - full view of cords      Difficult Airway Encountered?: No      Complications:  None    Airway Device:  Oral endotracheal tube    Airway Device Size:  7.0    Style/Cuff Inflation:  Cuffed    Tube secured:  20    Secured at:  The lips    Placement Verified By:  Capnometry    Complicating Factors:  None    Findings Post-Intubation:  BS equal bilateral and atraumatic/condition of teeth unchanged

## 2024-02-12 NOTE — SUBJECTIVE & OBJECTIVE
Current Facility-Administered Medications on File Prior to Encounter   Medication    [COMPLETED] dicyclomine capsule 20 mg    [COMPLETED] HYDROmorphone injection 1 mg    [COMPLETED] iohexoL (OMNIPAQUE 350) injection 100 mL    [COMPLETED] ketorolac injection 15 mg    [COMPLETED] ondansetron injection 4 mg    [COMPLETED] ondansetron injection 4 mg    [COMPLETED] piperacillin-tazobactam (ZOSYN) 4.5 g in dextrose 5 % in water (D5W) 100 mL IVPB (MB+)    [COMPLETED] simethicone 40 mg/0.6 mL drops 160 mg    [COMPLETED] sodium chloride 0.9% bolus 1,000 mL 1,000 mL    [DISCONTINUED] 0.9%  NaCl infusion    [DISCONTINUED] HYDROmorphone injection 1 mg     Current Outpatient Medications on File Prior to Encounter   Medication Sig    dextroamphetamine-amphetamine (ADDERALL) 20 mg tablet     ibuprofen (ADVIL,MOTRIN) 600 MG tablet Take 1 tablet (600 mg total) by mouth every 6 (six) hours as needed for Pain.    valACYclovir (VALTREX) 1000 MG tablet Take 1 tablet (1,000 mg total) by mouth 2 (two) times daily.    FLUoxetine 40 MG capsule Take 1 capsule (40 mg total) by mouth once daily.    pantoprazole (PROTONIX) 40 MG tablet Take 1 tablet (40 mg total) by mouth once daily.       Review of patient's allergies indicates:   Allergen Reactions    Aspirin Other (See Comments) and Rash    Codeine sulfate Other (See Comments) and Nausea And Vomiting    Hydrocodone-acetaminophen Itching, Rash, Other (See Comments) and Nausea And Vomiting       Past Medical History:   Diagnosis Date    ADHD     Anxiety and depression     Coronary artery disease     Narcolepsy     PCOS (polycystic ovarian syndrome)      Past Surgical History:   Procedure Laterality Date    Fibroid Removal  08/24/2023     Family History    Family history is unknown by patient.       Tobacco Use    Smoking status: Former     Types: Vaping with nicotine    Smokeless tobacco: Never   Substance and Sexual Activity    Alcohol use: Not Currently    Drug use: Never    Sexual activity:  Not Currently     Partners: Male     Birth control/protection: None     Review of Systems   Constitutional:  Positive for activity change and appetite change. Negative for chills, fatigue, fever and unexpected weight change.   Respiratory:  Negative for cough, shortness of breath, wheezing and stridor.    Cardiovascular:  Negative for chest pain, palpitations and leg swelling.   Gastrointestinal:  Positive for abdominal pain and nausea. Negative for abdominal distention, constipation, diarrhea and vomiting.   Genitourinary:  Negative for difficulty urinating, dysuria, frequency, hematuria and urgency.   Skin:  Negative for color change, pallor, rash and wound.   Hematological:  Does not bruise/bleed easily.     Objective:     Vital Signs (Most Recent):  Temp: 98.4 °F (36.9 °C) (02/12/24 0702)  Pulse: 68 (02/12/24 0702)  Resp: 17 (02/12/24 0702)  BP: (!) 106/59 (02/12/24 0702)  SpO2: 97 % (02/12/24 0702) Vital Signs (24h Range):  Temp:  [98 °F (36.7 °C)-98.6 °F (37 °C)] 98.4 °F (36.9 °C)  Pulse:  [] 68  Resp:  [17-20] 17  SpO2:  [96 %-98 %] 97 %  BP: ()/(56-71) 106/59     Weight: 122.5 kg (270 lb)  Body mass index is 49.38 kg/m².     Physical Exam  Vitals and nursing note reviewed.   Constitutional:       General: She is not in acute distress.     Appearance: She is well-developed. She is obese. She is not ill-appearing.   HENT:      Head: Normocephalic and atraumatic.      Right Ear: External ear normal.      Left Ear: External ear normal.      Nose: Nose normal.      Mouth/Throat:      Mouth: Mucous membranes are moist.   Eyes:      Extraocular Movements: Extraocular movements intact.      Conjunctiva/sclera: Conjunctivae normal.   Cardiovascular:      Rate and Rhythm: Normal rate and regular rhythm.   Pulmonary:      Effort: Pulmonary effort is normal. No respiratory distress.      Breath sounds: Normal breath sounds.   Abdominal:      General: Bowel sounds are normal.      Comments: Abdomen soft and  non-distended. + TTP in RLQ. Well-healed scars from lap myomectomy.    Musculoskeletal:      Cervical back: Neck supple.   Skin:     General: Skin is warm and dry.   Neurological:      Mental Status: She is alert and oriented to person, place, and time.   Psychiatric:         Behavior: Behavior normal.            I have reviewed all pertinent lab results within the past 24 hours.  CBC:   Recent Labs   Lab 02/11/24  1445   WBC 14.95*   RBC 4.83   HGB 12.1   HCT 38.8      MCV 80*   MCH 25.1*   MCHC 31.2*     CMP:   Recent Labs   Lab 02/11/24  1445      CALCIUM 8.6*   ALBUMIN 3.2*   PROT 7.0      K 3.4*   CO2 27      BUN 10   CREATININE 0.8   ALKPHOS 86   ALT 57*   AST 23   BILITOT 0.4       Significant Diagnostics:  I have reviewed all pertinent imaging results/findings within the past 24 hours.  CT abdomen pelvis reviewed as per HPI

## 2024-02-12 NOTE — ED NOTES
RN attempted to call report 2x. Phone disconnected both times. RN will attempt to call report again in 5 minutes.

## 2024-02-12 NOTE — ANESTHESIA PREPROCEDURE EVALUATION
02/12/2024  Suze Holt is a 28 y.o., female.    Patient Active Problem List   Diagnosis    Severe obesity (BMI >= 40)    Alcohol abuse    MDD (major depressive disorder), recurrent severe, without psychosis    Abdominal pain    Dyspepsia    Acute appendicitis      Pre-op Assessment    I have reviewed the Patient Summary Reports.     I have reviewed the Nursing Notes. I have reviewed the NPO Status.   I have reviewed the Medications.     Review of Systems  Anesthesia Hx:             Denies Family Hx of Anesthesia complications.    Denies Personal Hx of Anesthesia complications.                    Social:   Vapes nicotine  No current alcohol use      Hematology/Oncology:  Hematology Normal   Oncology Normal                                   EENT/Dental:  EENT/Dental Normal           Cardiovascular:  Cardiovascular Normal Exercise tolerance: good       Denies CAD.              ECG has been reviewed. No history of heart disease, cardiac symptoms or evaluation    Climbs stairs without a problem                         Pulmonary:  Pulmonary Normal                       Renal/:  Renal/ Normal                 Musculoskeletal:  Musculoskeletal Normal                Neurological:  Neurology Normal                                      Endocrine:  Endocrine Normal          Morbid Obesity / BMI > 40  Dermatological:  Skin Normal    Psych:   anxiety depression                Physical Exam  General: Alert and Cooperative    Airway:  Mallampati: II   Mouth Opening: Normal  TM Distance: Normal  Tongue: Normal  Neck ROM: Normal ROM        Anesthesia Plan  Type of Anesthesia, risks & benefits discussed:    Anesthesia Type: Gen ETT  Intra-op Monitoring Plan: Standard ASA Monitors  Induction:  IV  Informed Consent: Informed consent signed with the Patient and all parties understand the risks and agree with anesthesia  plan.  All questions answered.   ASA Score: 3  Day of Surgery Review of History & Physical: I have interviewed and examined the patient. I have reviewed the patient's H&P dated:     Ready For Surgery From Anesthesia Perspective.     .

## 2024-02-12 NOTE — PLAN OF CARE
[facility]  Initial Discharge Assessment       Primary Care Provider: Alicia Foley MD    Admission Diagnosis: Acute appendicitis [K35.80]    Admission Date: 2/11/2024  Expected Discharge Date:     Transition of Care Barriers: None    Payor: [facility] MCARE / Plan: [facility] DUAL COMPLETE PPO SNP / Product Type: Medicare Advantage /     Extended Emergency Contact Information  Primary Emergency Contact: ABBEY OJEDA  Mobile Phone: [phone]  Relation: Father  Preferred language: English    Discharge Plan A: Home  Discharge Plan B: Home Health      [facility] - [facility] [address]  [facility] [address]  Phone: [phone] Fax: [phone]    [id number] - [place], [place] - [address] AT [facility] [address]  [address]  [facility] [address]  Phone: [phone] Fax: [phone]    [id number] - [place], [place] - [id number] [facility]  [place] [address]  Phone: [phone] Fax: [phone]      Initial Assessment (most recent)       Adult Discharge Assessment - 02/12/24 0923          Discharge Assessment    Assessment Type Discharge Planning Assessment     Confirmed/corrected address, phone number and insurance Yes     Confirmed Demographics Correct on Facesheet     Source of Information patient     People in Home alone     Do you expect to return to your current living situation? Yes     Do you have help at home or someone to help you manage your care at home? Yes     Prior to hospitilization cognitive status: Alert/Oriented     Current cognitive status: Alert/Oriented     Walking or Climbing Stairs Difficulty no     Dressing/Bathing Difficulty no     Equipment Currently Used at Home none     Readmission within 30 days? No     Patient currently being followed by outpatient case management? No     Do you currently have service(s) that help you manage your care at home? No     Do you  take prescription medications? Yes     Do you have prescription coverage? Yes     Do you have any problems affording any of your prescribed medications? No     Is the patient taking medications as prescribed? yes     How do you get to doctors appointments? family or friend will provide     Are you on dialysis? No     Do you take coumadin? No     Discharge Plan A Home     Discharge Plan B Home Health     DME Needed Upon Discharge  none     Discharge Plan discussed with: Patient     Transition of Care Barriers None                   Independent with care.

## 2024-02-12 NOTE — ANESTHESIA POSTPROCEDURE EVALUATION
Anesthesia Post Evaluation    Patient: Suze Holt    Procedure(s) Performed: Procedure(s) (LRB):  XI ROBOTIC APPENDECTOMY (N/A)  BLOCK, TRANSVERSUS ABDOMINIS PLANE (N/A)    Final Anesthesia Type: general      Patient location during evaluation: PACU  Patient participation: Yes- Able to Participate  Level of consciousness: awake  Post-procedure vital signs: reviewed and stable  Pain management: adequate  Airway patency: patent    PONV status at discharge: No PONV  Anesthetic complications: no      Cardiovascular status: hemodynamically stable  Respiratory status: unassisted  Hydration status: euvolemic  Follow-up not needed.              Vitals Value Taken Time   /73 02/12/24 1614   Temp 36.8 °C (98.2 °F) 02/12/24 1614   Pulse 77 02/12/24 1614   Resp 11 02/12/24 1545   SpO2 93 % 02/12/24 1614   Vitals shown include unvalidated device data.      Event Time   Out of Recovery 02/12/2024 15:43:00         Pain/Aj Score: Pain Rating Prior to Med Admin: 9 (2/12/2024  9:17 AM)  Pain Rating Post Med Admin: 5 (2/12/2024 10:17 AM)  Aj Score: 8 (2/12/2024  3:43 PM)

## 2024-02-12 NOTE — ASSESSMENT & PLAN NOTE
To OR today for appendectomy    NPO in preparation for procedure  PRN pain and nausea control  IVF, IV antibiotics  Briefly discussed risks and benefits of appendectomy versus conservative management with antibiotics. Patient has agreed to proceed with appendectomy. Informed consent to be obtained by operative surgeon.

## 2024-02-12 NOTE — H&P
[facility] S[facility]  General Surgery  History & Physical    Patient Name: Suze Holt  MRN: [id number]  Admission Date: 2/11/2024  Attending Physician: Kriit Barroso MD   Primary Care Provider: Alicia Foley MD    Patient information was obtained from patient, past medical records, and ER records.     Subjective:     Chief Complaint/Reason for Admission: Abdominal pain    History of Present Illness: 29 y/o female with past medical history of depression and morbid obesity who presented to the ED for evaluation of right lower quadrant abdominal pain onset yesterday morning around 9 am and worsening until presentation to the ED. Associated nausea but no vomiting. Describes the pain as sharp. Denies fevers and chills. Past abdominal surgical history includes lap myomectomy last year. CT performed in the ED concerning for early acute appendicitis. WBC elevated at 14 on admission.    Current Facility-Administered Medications on File Prior to Encounter   Medication    [COMPLETED] dicyclomine capsule 20 mg    [COMPLETED] HYDROmorphone injection 1 mg    [COMPLETED] iohexoL (OMNIPAQUE 350) injection 100 mL    [COMPLETED] ketorolac injection 15 mg    [COMPLETED] ondansetron injection 4 mg    [COMPLETED] ondansetron injection 4 mg    [COMPLETED] piperacillin-tazobactam (ZOSYN) 4.5 g in dextrose 5 % in water (D5W) 100 mL IVPB (MB+)    [COMPLETED] simethicone 40 mg/0.6 mL drops 160 mg    [COMPLETED] sodium chloride 0.9% bolus 1,000 mL 1,000 mL    [DISCONTINUED] 0.9%  NaCl infusion    [DISCONTINUED] HYDROmorphone injection 1 mg     Current Outpatient Medications on File Prior to Encounter   Medication Sig    dextroamphetamine-amphetamine (ADDERALL) 20 mg tablet     ibuprofen (ADVIL,MOTRIN) 600 MG tablet Take 1 tablet (600 mg total) by mouth every 6 (six) hours as needed for Pain.    valACYclovir (VALTREX) 1000 MG tablet Take 1 tablet (1,000 mg total) by mouth 2 (two) times daily.    FLUoxetine 40 MG capsule Take 1  capsule (40 mg total) by mouth once daily.    pantoprazole (PROTONIX) 40 MG tablet Take 1 tablet (40 mg total) by mouth once daily.       Review of patient's allergies indicates:   Allergen Reactions    Aspirin Other (See Comments) and Rash    Codeine sulfate Other (See Comments) and Nausea And Vomiting    Hydrocodone-acetaminophen Itching, Rash, Other (See Comments) and Nausea And Vomiting       Past Medical History:   Diagnosis Date    ADHD     Anxiety and depression     Coronary artery disease     Narcolepsy     PCOS (polycystic ovarian syndrome)      Past Surgical History:   Procedure Laterality Date    Fibroid Removal  08/24/2023     Family History    Family history is unknown by patient.       Tobacco Use    Smoking status: Former     Types: Vaping with nicotine    Smokeless tobacco: Never   Substance and Sexual Activity    Alcohol use: Not Currently    Drug use: Never    Sexual activity: Not Currently     Partners: Male     Birth control/protection: None     Review of Systems   Constitutional:  Positive for activity change and appetite change. Negative for chills, fatigue, fever and unexpected weight change.   Respiratory:  Negative for cough, shortness of breath, wheezing and stridor.    Cardiovascular:  Negative for chest pain, palpitations and leg swelling.   Gastrointestinal:  Positive for abdominal pain and nausea. Negative for abdominal distention, constipation, diarrhea and vomiting.   Genitourinary:  Negative for difficulty urinating, dysuria, frequency, hematuria and urgency.   Skin:  Negative for color change, pallor, rash and wound.   Hematological:  Does not bruise/bleed easily.     Objective:     Vital Signs (Most Recent):  Temp: 98.4 °F (36.9 °C) (02/12/24 0702)  Pulse: 68 (02/12/24 0702)  Resp: 17 (02/12/24 0702)  BP: (!) 106/59 (02/12/24 0702)  SpO2: 97 % (02/12/24 0702) Vital Signs (24h Range):  Temp:  [98 °F (36.7 °C)-98.6 °F (37 °C)] 98.4 °F (36.9 °C)  Pulse:  [] 68  Resp:  [17-20]  17  SpO2:  [96 %-98 %] 97 %  BP: ()/(56-71) 106/59     Weight: 122.5 kg (270 lb)  Body mass index is 49.38 kg/m².     Physical Exam  Vitals and nursing note reviewed.   Constitutional:       General: She is not in acute distress.     Appearance: She is well-developed. She is obese. She is not ill-appearing.   HENT:      Head: Normocephalic and atraumatic.      Right Ear: External ear normal.      Left Ear: External ear normal.      Nose: Nose normal.      Mouth/Throat:      Mouth: Mucous membranes are moist.   Eyes:      Extraocular Movements: Extraocular movements intact.      Conjunctiva/sclera: Conjunctivae normal.   Cardiovascular:      Rate and Rhythm: Normal rate and regular rhythm.   Pulmonary:      Effort: Pulmonary effort is normal. No respiratory distress.      Breath sounds: Normal breath sounds.   Abdominal:      General: Bowel sounds are normal.      Comments: Abdomen soft and non-distended. + TTP in RLQ. Well-healed scars from lap myomectomy.    Musculoskeletal:      Cervical back: Neck supple.   Skin:     General: Skin is warm and dry.   Neurological:      Mental Status: She is alert and oriented to person, place, and time.   Psychiatric:         Behavior: Behavior normal.            I have reviewed all pertinent lab results within the past 24 hours.  CBC:   Recent Labs   Lab 02/11/24  1445   WBC 14.95*   RBC 4.83   HGB 12.1   HCT 38.8      MCV 80*   MCH 25.1*   MCHC 31.2*     CMP:   Recent Labs   Lab 02/11/24  1445      CALCIUM 8.6*   ALBUMIN 3.2*   PROT 7.0      K 3.4*   CO2 27      BUN 10   CREATININE 0.8   ALKPHOS 86   ALT 57*   AST 23   BILITOT 0.4       Significant Diagnostics:  I have reviewed all pertinent imaging results/findings within the past 24 hours.  CT abdomen pelvis reviewed as per HPI    Assessment/Plan:     * Acute appendicitis  To OR today for appendectomy    NPO in preparation for procedure  PRN pain and nausea control  IVF, IV antibiotics  Briefly  discussed risks and benefits of appendectomy versus conservative management with antibiotics. Patient has agreed to proceed with appendectomy. Informed consent to be obtained by operative surgeon.     MDD (major depressive disorder), recurrent severe, without psychosis  Restart home meds once on a diet post-operatively    Severe obesity (BMI >= 40)  Recommend weight loss, discussion with PCP as outpatient      VTE Risk Mitigation (From admission, onward)           Ordered     IP VTE HIGH RISK PATIENT  Once         02/12/24 0046     Place sequential compression device  Until discontinued         02/12/24 0046                    Masha Alvarado PA-C  General Surgery  [facility]

## 2024-02-12 NOTE — OR NURSING
Patient belongs of wallet and cell phone removed from the pre-op locker and taken to the 5th floor.  BUSTER Thornton stated she was her nurse and received the belongings from me to give to the patient.

## 2024-02-12 NOTE — PLAN OF CARE
Discussed poc with pt, pt verbalized understanding    Purposeful rounding complete    VS wnl    Fall precautions in place, remains injury free      IVFs infusing   Pt NPO   CHG bath given  Abx given as prescribed  Bed locked at lowest position  Call light within reach    Chart check complete  Will cont with POC

## 2024-02-13 ENCOUNTER — TELEPHONE (OUTPATIENT)
Dept: SURGERY | Facility: CLINIC | Age: 29
End: 2024-02-13
Payer: MEDICARE

## 2024-02-13 VITALS
TEMPERATURE: 98 F | DIASTOLIC BLOOD PRESSURE: 58 MMHG | HEART RATE: 81 BPM | WEIGHT: 252.88 LBS | SYSTOLIC BLOOD PRESSURE: 102 MMHG | RESPIRATION RATE: 16 BRPM | BODY MASS INDEX: 46.53 KG/M2 | HEIGHT: 62 IN | OXYGEN SATURATION: 93 %

## 2024-02-13 PROBLEM — N30.90 CYSTITIS: Status: ACTIVE | Noted: 2024-02-13

## 2024-02-13 LAB
ANION GAP SERPL CALC-SCNC: 8 MMOL/L (ref 8–16)
BUN SERPL-MCNC: 5 MG/DL (ref 6–20)
CALCIUM SERPL-MCNC: 8.6 MG/DL (ref 8.7–10.5)
CHLORIDE SERPL-SCNC: 107 MMOL/L (ref 95–110)
CO2 SERPL-SCNC: 24 MMOL/L (ref 23–29)
CREAT SERPL-MCNC: 0.7 MG/DL (ref 0.5–1.4)
ERYTHROCYTE [DISTWIDTH] IN BLOOD BY AUTOMATED COUNT: 15.9 % (ref 11.5–14.5)
EST. GFR  (NO RACE VARIABLE): >60 ML/MIN/1.73 M^2
GLUCOSE SERPL-MCNC: 109 MG/DL (ref 70–110)
HCT VFR BLD AUTO: 33.3 % (ref 37–48.5)
HGB BLD-MCNC: 10.7 G/DL (ref 12–16)
MCH RBC QN AUTO: 25.3 PG (ref 27–31)
MCHC RBC AUTO-ENTMCNC: 32.1 G/DL (ref 32–36)
MCV RBC AUTO: 79 FL (ref 82–98)
PLATELET # BLD AUTO: 321 K/UL (ref 150–450)
PMV BLD AUTO: 9.5 FL (ref 9.2–12.9)
POTASSIUM SERPL-SCNC: 4.1 MMOL/L (ref 3.5–5.1)
RBC # BLD AUTO: 4.23 M/UL (ref 4–5.4)
SODIUM SERPL-SCNC: 139 MMOL/L (ref 136–145)
WBC # BLD AUTO: 12.41 K/UL (ref 3.9–12.7)

## 2024-02-13 PROCEDURE — 25000003 PHARM REV CODE 250

## 2024-02-13 PROCEDURE — 36415 COLL VENOUS BLD VENIPUNCTURE: CPT | Performed by: SURGERY

## 2024-02-13 PROCEDURE — 85027 COMPLETE CBC AUTOMATED: CPT | Performed by: SURGERY

## 2024-02-13 PROCEDURE — 25000003 PHARM REV CODE 250: Performed by: SURGERY

## 2024-02-13 PROCEDURE — 63600175 PHARM REV CODE 636 W HCPCS: Performed by: SURGERY

## 2024-02-13 PROCEDURE — 94799 UNLISTED PULMONARY SVC/PX: CPT | Mod: XB

## 2024-02-13 PROCEDURE — 80048 BASIC METABOLIC PNL TOTAL CA: CPT | Performed by: SURGERY

## 2024-02-13 RX ORDER — CIPROFLOXACIN 500 MG/1
500 TABLET ORAL 2 TIMES DAILY
Qty: 6 TABLET | Refills: 0 | Status: SHIPPED | OUTPATIENT
Start: 2024-02-13 | End: 2024-02-16

## 2024-02-13 RX ORDER — OXYCODONE AND ACETAMINOPHEN 7.5; 325 MG/1; MG/1
1 TABLET ORAL EVERY 4 HOURS PRN
Qty: 20 TABLET | Refills: 0 | Status: SHIPPED | OUTPATIENT
Start: 2024-02-13

## 2024-02-13 RX ORDER — CIPROFLOXACIN 750 MG/1
750 TABLET, FILM COATED ORAL EVERY 12 HOURS
Status: DISCONTINUED | OUTPATIENT
Start: 2024-02-13 | End: 2024-02-13 | Stop reason: HOSPADM

## 2024-02-13 RX ADMIN — CHLORHEXIDINE GLUCONATE 0.12% ORAL RINSE 10 ML: 1.2 LIQUID ORAL at 08:02

## 2024-02-13 RX ADMIN — CIPROFLOXACIN 750 MG: 750 TABLET, FILM COATED ORAL at 09:02

## 2024-02-13 RX ADMIN — ENOXAPARIN SODIUM 40 MG: 40 INJECTION SUBCUTANEOUS at 08:02

## 2024-02-13 RX ADMIN — FLUOXETINE 40 MG: 20 CAPSULE ORAL at 08:02

## 2024-02-13 RX ADMIN — OXYCODONE HYDROCHLORIDE 10 MG: 5 TABLET ORAL at 10:02

## 2024-02-13 RX ADMIN — METRONIDAZOLE 500 MG: 5 INJECTION, SOLUTION INTRAVENOUS at 04:02

## 2024-02-13 RX ADMIN — OXYCODONE HYDROCHLORIDE 10 MG: 5 TABLET ORAL at 04:02

## 2024-02-13 NOTE — SUBJECTIVE & OBJECTIVE
Interval History:  Incisional soreness.  Tolerating a diet.      Discharge home    Medications:  Continuous Infusions:   lactated ringers Stopped (02/13/24 0400)     Scheduled Meds:   chlorhexidine  10 mL Mouth/Throat BID    ciprofloxacin  400 mg Intravenous Q12H    enoxparin  40 mg Subcutaneous Q12H    FLUoxetine  40 mg Oral Daily    methylphenidate HCl  20 mg Oral BID WM    metronidazole  500 mg Intravenous Q8H    scopolamine  1 patch Transdermal Once Pre-Op     PRN Meds:diphenhydrAMINE, HYDROmorphone, melatonin, metoclopramide, ondansetron, oxyCODONE, oxyCODONE, sodium chloride 0.9%, sodium chloride 0.9%     Review of patient's allergies indicates:   Allergen Reactions    Aspirin Other (See Comments) and Rash    Codeine sulfate Other (See Comments) and Nausea And Vomiting    Hydrocodone-acetaminophen Itching, Rash, Other (See Comments) and Nausea And Vomiting    Piperacillin-tazobactam Itching and Rash     Objective:     Vital Signs (Most Recent):  Temp: 98.1 °F (36.7 °C) (02/13/24 0727)  Pulse: 81 (02/13/24 0727)  Resp: 16 (02/13/24 0727)  BP: (!) 102/58 (02/13/24 0727)  SpO2: (!) 93 % (02/13/24 0727) Vital Signs (24h Range):  Temp:  [97.6 °F (36.4 °C)-98.4 °F (36.9 °C)] 98.1 °F (36.7 °C)  Pulse:  [] 81  Resp:  [10-21] 16  SpO2:  [91 %-100 %] 93 %  BP: ()/(56-77) 102/58     Weight: 114.7 kg (252 lb 13.9 oz)  Body mass index is 46.25 kg/m².    Intake/Output - Last 3 Shifts         02/11 0700 02/12 0659 02/12 0700 02/13 0659 02/13 0700 02/14 0659    P.O.  240     I.V. (mL/kg)  1230.2 (10.7)     IV Piggyback  1868.4     Total Intake(mL/kg)  3338.6 (29.1)     Blood  15     Total Output  15     Net  +3323.6            Urine Occurrence  2 x              Physical Exam  Vitals and nursing note reviewed.   Constitutional:       Appearance: She is well-developed. She is obese.   HENT:      Head: Normocephalic.   Eyes:      Pupils: Pupils are equal, round, and reactive to light.   Neck:      Thyroid: No  thyromegaly.      Vascular: No JVD.      Trachea: No tracheal deviation.   Cardiovascular:      Rate and Rhythm: Normal rate and regular rhythm.      Heart sounds: Normal heart sounds.   Pulmonary:      Breath sounds: Normal breath sounds. No wheezing.   Abdominal:      General: Bowel sounds are normal. There is no distension.      Palpations: Abdomen is soft. Abdomen is not rigid. There is no mass.      Tenderness: There is no abdominal tenderness. There is no guarding or rebound.      Comments: Obese, incisions are clean   Musculoskeletal:         General: Normal range of motion.   Lymphadenopathy:      Cervical: No cervical adenopathy.   Skin:     General: Skin is warm and dry.      Findings: No erythema or rash.   Neurological:      Mental Status: She is oriented to person, place, and time.   Psychiatric:         Mood and Affect: Mood normal.         Behavior: Behavior normal.         Thought Content: Thought content normal.         Judgment: Judgment normal.          Significant Labs:  I have reviewed all pertinent lab results within the past 24 hours.  CBC:   Recent Labs   Lab 02/13/24  0457   WBC 12.41   RBC 4.23   HGB 10.7*   HCT 33.3*      MCV 79*   MCH 25.3*   MCHC 32.1     CMP:   Recent Labs   Lab 02/12/24  0803 02/13/24  0457   GLU 78 109   CALCIUM 8.0* 8.6*   ALBUMIN 2.9*  --    PROT 6.0  --     139   K 3.8 4.1   CO2 16* 24   * 107   BUN 6 5*   CREATININE 0.7 0.7   ALKPHOS 69  --    ALT 36  --    AST 20  --    BILITOT 0.7  --      Microbiology Results (last 7 days)       ** No results found for the last 168 hours. **            Significant Diagnostics:  I have reviewed all pertinent imaging results/findings within the past 24 hours.  No new

## 2024-02-13 NOTE — PROGRESS NOTES
Pharmacist Renal Dose Adjustment Note    Suze Holt is a 28 y.o. female being treated with the medication Cipro    Patient Data:    Vital Signs (Most Recent):  Temp: 98.1 °F (36.7 °C) (02/13/24 0727)  Pulse: 81 (02/13/24 0727)  Resp: 16 (02/13/24 0727)  BP: (!) 102/58 (02/13/24 0727)  SpO2: (!) 93 % (02/13/24 0727) Vital Signs (72h Range):  Temp:  [97.6 °F (36.4 °C)-98.6 °F (37 °C)]   Pulse:  []   Resp:  [10-21]   BP: ()/(56-77)   SpO2:  [91 %-100 %]      Recent Labs   Lab 02/11/24  1445 02/12/24  0803 02/13/24  0457   CREATININE 0.8 0.7 0.7     Serum creatinine: 0.7 mg/dL 02/13/24 0457  Estimated creatinine clearance: 140.8 mL/min    Medication:cipro 500 mg po q12hrs will be changed to medication:cipro 750 mg po bid per renal dosing protocol.   Pharmacist's Name: Madyson Cota  Pharmacist's Extension: [phone]

## 2024-02-13 NOTE — ASSESSMENT & PLAN NOTE
Urinalysis showed multiple organisms however the patient had very cloudy urine.  Will 5 days of ciprofloxacin at home

## 2024-02-13 NOTE — PROGRESS NOTES
[facility]  General Surgery  Progress Note    Subjective:     History of Present Illness:  29 y/o female with past medical history of depression and morbid obesity who presented to the ED for evaluation of right lower quadrant abdominal pain onset yesterday morning around 9 am and worsening until presentation to the ED. Associated nausea but no vomiting. Describes the pain as sharp. Denies fevers and chills. Past abdominal surgical history includes lap myomectomy last year. CT performed in the ED concerning for early acute appendicitis. WBC elevated at 14 on admission.    Post-Op Info:  Procedure(s) (LRB):  XI ROBOTIC APPENDECTOMY (N/A)  BLOCK, TRANSVERSUS ABDOMINIS PLANE (N/A)   1 Day Post-Op     Interval History:  Incisional soreness.  Tolerating a diet.      Discharge home    Medications:  Continuous Infusions:   lactated ringers Stopped (02/13/24 0400)     Scheduled Meds:   chlorhexidine  10 mL Mouth/Throat BID    ciprofloxacin  400 mg Intravenous Q12H    enoxparin  40 mg Subcutaneous Q12H    FLUoxetine  40 mg Oral Daily    methylphenidate HCl  20 mg Oral BID WM    metronidazole  500 mg Intravenous Q8H    scopolamine  1 patch Transdermal Once Pre-Op     PRN Meds:diphenhydrAMINE, HYDROmorphone, melatonin, metoclopramide, ondansetron, oxyCODONE, oxyCODONE, sodium chloride 0.9%, sodium chloride 0.9%     Review of patient's allergies indicates:   Allergen Reactions    Aspirin Other (See Comments) and Rash    Codeine sulfate Other (See Comments) and Nausea And Vomiting    Hydrocodone-acetaminophen Itching, Rash, Other (See Comments) and Nausea And Vomiting    Piperacillin-tazobactam Itching and Rash     Objective:     Vital Signs (Most Recent):  Temp: 98.1 °F (36.7 °C) (02/13/24 0727)  Pulse: 81 (02/13/24 0727)  Resp: 16 (02/13/24 0727)  BP: (!) 102/58 (02/13/24 0727)  SpO2: (!) 93 % (02/13/24 0727) Vital Signs (24h Range):  Temp:  [97.6 °F (36.4 °C)-98.4 °F (36.9 °C)] 98.1 °F (36.7 °C)  Pulse:  []  81  Resp:  [10-21] 16  SpO2:  [91 %-100 %] 93 %  BP: ()/(56-77) 102/58     Weight: 114.7 kg (252 lb 13.9 oz)  Body mass index is 46.25 kg/m².    Intake/Output - Last 3 Shifts         02/11 0700  02/12 0659 02/12 0700  02/13 0659 02/13 0700  02/14 0659    P.O.  240     I.V. (mL/kg)  1230.2 (10.7)     IV Piggyback  1868.4     Total Intake(mL/kg)  3338.6 (29.1)     Blood  15     Total Output  15     Net  +3323.6            Urine Occurrence  2 x              Physical Exam  Vitals and nursing note reviewed.   Constitutional:       Appearance: She is well-developed. She is obese.   HENT:      Head: Normocephalic.   Eyes:      Pupils: Pupils are equal, round, and reactive to light.   Neck:      Thyroid: No thyromegaly.      Vascular: No JVD.      Trachea: No tracheal deviation.   Cardiovascular:      Rate and Rhythm: Normal rate and regular rhythm.      Heart sounds: Normal heart sounds.   Pulmonary:      Breath sounds: Normal breath sounds. No wheezing.   Abdominal:      General: Bowel sounds are normal. There is no distension.      Palpations: Abdomen is soft. Abdomen is not rigid. There is no mass.      Tenderness: There is no abdominal tenderness. There is no guarding or rebound.      Comments: Obese, incisions are clean   Musculoskeletal:         General: Normal range of motion.   Lymphadenopathy:      Cervical: No cervical adenopathy.   Skin:     General: Skin is warm and dry.      Findings: No erythema or rash.   Neurological:      Mental Status: She is oriented to person, place, and time.   Psychiatric:         Mood and Affect: Mood normal.         Behavior: Behavior normal.         Thought Content: Thought content normal.         Judgment: Judgment normal.          Significant Labs:  I have reviewed all pertinent lab results within the past 24 hours.  CBC:   Recent Labs   Lab 02/13/24  0457   WBC 12.41   RBC 4.23   HGB 10.7*   HCT 33.3*      MCV 79*   MCH 25.3*   MCHC 32.1     CMP:   Recent Labs   Lab  02/12/24  0803 02/13/24  0457   GLU 78 109   CALCIUM 8.0* 8.6*   ALBUMIN 2.9*  --    PROT 6.0  --     139   K 3.8 4.1   CO2 16* 24   * 107   BUN 6 5*   CREATININE 0.7 0.7   ALKPHOS 69  --    ALT 36  --    AST 20  --    BILITOT 0.7  --      Microbiology Results (last 7 days)       ** No results found for the last 168 hours. **            Significant Diagnostics:  I have reviewed all pertinent imaging results/findings within the past 24 hours.  No new  Assessment/Plan:     * Acute appendicitis  Robotic appendectomy 02/12/2024   Tolerating a diet   White count normal   Afebrile   Discharge home     Cystitis  Urinalysis showed multiple organisms however the patient had very cloudy urine.  Will 5 days of ciprofloxacin at home    MDD (major depressive disorder), recurrent severe, without psychosis  Restart home meds once on a diet post-operatively    Severe obesity (BMI >= 40)  Recommend weight loss, discussion with PCP as outpatient        Kirit Barroso MD  General Surgery  [facility]

## 2024-02-13 NOTE — PLAN OF CARE
Problem: Adult Inpatient Plan of Care  Goal: Plan of Care Review  Outcome: Ongoing, Progressing  Goal: Patient-Specific Goal (Individualized)  Outcome: Ongoing, Progressing  Goal: Absence of Hospital-Acquired Illness or Injury  Outcome: Ongoing, Progressing  Goal: Optimal Comfort and Wellbeing  Outcome: Ongoing, Progressing  Goal: Readiness for Transition of Care  Outcome: Ongoing, Progressing     Problem: Infection  Goal: Absence of Infection Signs and Symptoms  Outcome: Ongoing, Progressing     Problem: Bariatric Environmental Safety  Goal: Safety Maintained with Care  Outcome: Ongoing, Progressing

## 2024-02-13 NOTE — TELEPHONE ENCOUNTER
----- Message from Kirit Barroso MD sent at 2/13/2024  8:57 AM CST -----  Patient has a combined insurance 1 of which is Medicaid.  Please make sure she has a follow-up appointment with myself or Masha 3 weeks

## 2024-02-13 NOTE — HOSPITAL COURSE
02/13/2024.  Laparoscopic appendectomy.  Doing well.  Pain controlled.  Regular diet discharge home   Urine culture showed multiple organisms but given the fact that the urine was very cloudy we put the Thomas and we will treat with Cipro for 3 days

## 2024-02-13 NOTE — DISCHARGE SUMMARY
[facility] Surg  General Surgery  Discharge Summary      Patient Name: Suze Holt  MRN: [id number]  Admission Date: 2/11/2024  Hospital Length of Stay: 2 days  Discharge Date and Time:  02/13/2024 8:56 AM  Attending Physician: Kirit Barroso MD   Discharging Provider: Kirit Barroso MD  Primary Care Provider: Alicia Foley MD    HPI:   29 y/o female with past medical history of depression and morbid obesity who presented to the ED for evaluation of right lower quadrant abdominal pain onset yesterday morning around 9 am and worsening until presentation to the ED. Associated nausea but no vomiting. Describes the pain as sharp. Denies fevers and chills. Past abdominal surgical history includes lap myomectomy last year. CT performed in the ED concerning for early acute appendicitis. WBC elevated at 14 on admission.    Procedure(s) (LRB):  XI ROBOTIC APPENDECTOMY (N/A)  BLOCK, TRANSVERSUS ABDOMINIS PLANE (N/A)      Indwelling Lines/Drains at time of discharge:   Lines/Drains/Airways       None                 Hospital Course: 02/13/2024.  Laparoscopic appendectomy.  Doing well.  Pain controlled.  Regular diet discharge home   Urine culture showed multiple organisms but given the fact that the urine was very cloudy we put the Thomas and we will treat with Cipro for 3 days    Goals of Care Treatment Preferences:  Code Status: Full Code      Consults:     Significant Diagnostic Studies: Labs: BMP:   Recent Labs   Lab 02/11/24  1445 02/12/24  0803 02/13/24  0457    78 109    137 139   K 3.4* 3.8 4.1    111* 107   CO2 27 16* 24   BUN 10 6 5*   CREATININE 0.8 0.7 0.7   CALCIUM 8.6* 8.0* 8.6*   , CMP   Recent Labs   Lab 02/11/24  1445 02/12/24  0803 02/13/24  0457    137 139   K 3.4* 3.8 4.1    111* 107   CO2 27 16* 24    78 109   BUN 10 6 5*   CREATININE 0.8 0.7 0.7   CALCIUM 8.6* 8.0* 8.6*   PROT 7.0 6.0  --    ALBUMIN 3.2* 2.9*  --    BILITOT 0.4 0.7  --    ALKPHOS 86  69  --    AST 23 20  --    ALT 57* 36  --    ANIONGAP 3 10 8   , and CBC   Recent Labs   Lab 02/11/24  1445 02/12/24  1140 02/13/24  0457   WBC 14.95* 7.78 12.41   HGB 12.1 10.8* 10.7*   HCT 38.8 33.7* 33.3*    284 321     Radiology:  CT scan at [facility] showed acute appendicitis.      Urinalysis was concerning for urinary tract infection or cystitis.      Culture of the urine showed multiple organisms    Pending Diagnostic Studies:       Procedure Component Value Units Date/Time    Specimen to Pathology, Surgery General Surgery [[id number]] Collected: 02/12/24 1400    Order Status: Sent Lab Status: In process Updated: 02/13/24 0808    Specimen: Tissue           Final Active Diagnoses:    Diagnosis Date Noted POA    PRINCIPAL PROBLEM:  Acute appendicitis [K35.80] 02/12/2024 Yes    Cystitis [N30.90] 02/13/2024 Yes    MDD (major depressive disorder), recurrent severe, without psychosis [F33.2] 12/19/2022 Yes    Severe obesity (BMI >= 40) [E66.01] 12/14/2022 Yes      Problems Resolved During this Admission:      Discharged Condition: poor    Disposition: Home or Self Care    Follow Up:    Patient Instructions:      Diet Adult Regular     Lifting restrictions   Order Comments: No lifting more than 20 lb for 2 weeks     Notify your health care provider if you experience any of the following:  temperature >100.4     Notify your health care provider if you experience any of the following:  persistent nausea and vomiting or diarrhea     Notify your health care provider if you experience any of the following:  severe uncontrolled pain     Notify your health care provider if you experience any of the following:  redness, tenderness, or signs of infection (pain, swelling, redness, odor or green/yellow discharge around incision site)     No dressing needed     Medications:  Reconciled Home Medications:      Medication List        START taking these medications      ciprofloxacin HCl 500 MG tablet  Commonly  known as: CIPRO  Take 1 tablet (500 mg total) by mouth 2 (two) times daily. for 3 days     oxyCODONE-acetaminophen 7.5-325 mg per tablet  Commonly known as: PERCOCET  Take 1 tablet by mouth every 4 (four) hours as needed for Pain.            CONTINUE taking these medications      dextroamphetamine-amphetamine 20 mg tablet  Commonly known as: ADDERALL     FLUoxetine 40 MG capsule  Take 1 capsule (40 mg total) by mouth once daily.     ibuprofen 600 MG tablet  Commonly known as: ADVIL,MOTRIN  Take 1 tablet (600 mg total) by mouth every 6 (six) hours as needed for Pain.     pantoprazole 40 MG tablet  Commonly known as: PROTONIX  Take 1 tablet (40 mg total) by mouth once daily.     valACYclovir 1000 MG tablet  Commonly known as: VALTREX  Take 1 tablet (1,000 mg total) by mouth 2 (two) times daily.            Time spent on the discharge of patient: 2 minutes    Kirit Barroso MD  General Surgery  '[facility]

## 2024-02-13 NOTE — NURSING
Patient experienced itching and rash while receiving IV Zosyn. Notified on call general surgery and spoke with Dr. Grajeda. IV antibiotics switched to IV Flagyl and Rocephin. PRN Benadryl ordered.

## 2024-02-13 NOTE — NURSING
Discharge instructions reviewed with pt, pt voiced understanding, peripheral IV removed catheter intact, pt to leave via family vehicle.

## 2024-02-13 NOTE — PLAN OF CARE
[facility] Med [facility]  Discharge Final Note    Primary Care Provider: Alicia Foley MD    Expected Discharge Date: 2/13/2024    Final Discharge Note (most recent)       Final Note - 02/13/24 0859          Final Note    Assessment Type Final Discharge Note     Anticipated Discharge Disposition Home or Self Care        Post-Acute Status    Discharge Delays None known at this time                     Contact Info       Kirit Barroso MD   Specialty: General Surgery    [address]  [place] [address]   Phone: [phone]       Next Steps: Follow up in 3 week(s)    Instructions: post op appt          No d/c needs/orders at this time.

## 2024-02-13 NOTE — ASSESSMENT & PLAN NOTE
Robotic appendectomy 02/12/2024   Tolerating a diet   White count normal   Afebrile   Discharge home

## 2024-02-15 LAB
FINAL PATHOLOGIC DIAGNOSIS: NORMAL
GROSS: NORMAL
Lab: NORMAL

## 2024-02-22 ENCOUNTER — TELEPHONE (OUTPATIENT)
Dept: SURGERY | Facility: CLINIC | Age: 29
End: 2024-02-22
Payer: MEDICARE

## 2024-02-22 NOTE — TELEPHONE ENCOUNTER
----- Message from Lata Dalton MA sent at 2/22/2024  2:23 PM CST -----  Contact: deidre@ [phone]    ----- Message -----  From: Kristen Sanches MA  Sent: 2/22/2024   2:20 PM CST  To: Jenny Flanagan Staff    Pt called                  In regards to upcoming appt with provider to se if staff can set  up transportation for her to make it to appt.

## 2024-04-15 ENCOUNTER — PATIENT MESSAGE (OUTPATIENT)
Dept: SURGERY | Facility: HOSPITAL | Age: 29
End: 2024-04-15
Payer: MEDICARE

## 2024-10-16 NOTE — DISCHARGE INSTRUCTIONS
Please call for any fever, increase in pain, nausea or vomiting or redness or drainage from incision(s).    No lifting more than 30 pounds for 2 weeks    No driving if taking the pain medicine    May shower     Removed the glue when it becomes loose    If you become constipated from the pain medication you can use over the counter laxatives,  Miralax or Glycolax, or milk of magnesia for severe constipation.    Our office phone numbers are  [phone] and     Our office fax  number is #[phone]     Negative

## (undated) DEVICE — GLOVE SIGNATURE ESSNTL LTX 8

## (undated) DEVICE — NDL PNEUMO INSUFFLATI 120MM

## (undated) DEVICE — COVER LIGHT HANDLE 80/CA

## (undated) DEVICE — SUT MONOCRYL 4.0 PS2 CP496G

## (undated) DEVICE — IRRIGATOR ENDOWRIST XI SUCTION

## (undated) DEVICE — CLIPPER BLADE MOD 4406 (CAREF)

## (undated) DEVICE — SPONGE COTTON TRAY 4X4IN

## (undated) DEVICE — NDL ECLIPSE SAF REG 25GX1.5IN

## (undated) DEVICE — RELOAD SUREFORM 45 2.5 WHT 6R

## (undated) DEVICE — OBTURATOR BLADELESS 8MM XI CLR

## (undated) DEVICE — MANIFOLD 4 PORT

## (undated) DEVICE — SYR LUER LOCK STERILE 10ML

## (undated) DEVICE — RELOAD SUREFORM 45 3.5 BLU 6R

## (undated) DEVICE — SUT VICRYL 3-0 27 SH

## (undated) DEVICE — SOL STRL WATER INJ 1000ML BG

## (undated) DEVICE — DRAPE LAPSCP CHOLE 122X102X78

## (undated) DEVICE — ADHESIVE DERMABOND ADVANCED

## (undated) DEVICE — DRAPE THREE-QTR REINF 53X77IN

## (undated) DEVICE — SUT MCRYL PLUS 4-0 PS2 27IN

## (undated) DEVICE — SUT CTD VICRYL 0 UND BR SUT

## (undated) DEVICE — ELECTRODE REM PLYHSV RETURN 9

## (undated) DEVICE — PACK BASIC SETUP SC BR

## (undated) DEVICE — DEVICE CLOSURE DISP 14G

## (undated) DEVICE — DRAPE COLUMN DAVINCI XI

## (undated) DEVICE — COVER TIP CURVED SCISSORS XI

## (undated) DEVICE — HEADREST ROUND DISP FOAM 9IN

## (undated) DEVICE — TROCAR ENDOPATH XCEL 12X100MM

## (undated) DEVICE — STAPLER SUREFORM SGL USE 45

## (undated) DEVICE — APPLICATOR CHLORAPREP ORN 26ML

## (undated) DEVICE — TUBING MEDI-VAC 20FT .25IN

## (undated) DEVICE — GOWN POLY REINF BRTH SLV XL

## (undated) DEVICE — BAG TISSUE RETRIEVAL 5MM

## (undated) DEVICE — SOL NORMAL USPCA 0.9%

## (undated) DEVICE — TOWEL OR DISP STRL BLUE 4/PK

## (undated) DEVICE — DRAPE ARM DAVINCI XI

## (undated) DEVICE — KIT ANTIFOG W/SPONG & FLUID

## (undated) DEVICE — TRAY CATH FOL SIL URIMTR 16FR

## (undated) DEVICE — GLOVE SURG BIOGEL LATEX SZ 7.5

## (undated) DEVICE — SEAL UNIVERSAL 5MM-8MM XI

## (undated) DEVICE — SYR 3CC LUER LOC